# Patient Record
Sex: MALE | Employment: UNEMPLOYED | URBAN - METROPOLITAN AREA
[De-identification: names, ages, dates, MRNs, and addresses within clinical notes are randomized per-mention and may not be internally consistent; named-entity substitution may affect disease eponyms.]

---

## 2023-07-24 ENCOUNTER — OFFICE VISIT (OUTPATIENT)
Dept: URGENT CARE | Facility: CLINIC | Age: 33
End: 2023-07-24
Payer: COMMERCIAL

## 2023-07-24 VITALS
RESPIRATION RATE: 16 BRPM | SYSTOLIC BLOOD PRESSURE: 114 MMHG | TEMPERATURE: 99 F | WEIGHT: 128 LBS | OXYGEN SATURATION: 99 % | BODY MASS INDEX: 17.92 KG/M2 | DIASTOLIC BLOOD PRESSURE: 75 MMHG | HEART RATE: 58 BPM | HEIGHT: 71 IN

## 2023-07-24 DIAGNOSIS — M25.521 RIGHT ELBOW PAIN: Primary | ICD-10-CM

## 2023-07-24 DIAGNOSIS — M25.532 LEFT WRIST PAIN: ICD-10-CM

## 2023-07-24 DIAGNOSIS — T14.90XA INJURY: ICD-10-CM

## 2023-07-24 PROCEDURE — 73080 XR ELBOW COMPLETE 3 VIEW RIGHT: ICD-10-PCS | Mod: RT,S$GLB,, | Performed by: RADIOLOGY

## 2023-07-24 PROCEDURE — 73080 X-RAY EXAM OF ELBOW: CPT | Mod: RT,S$GLB,, | Performed by: RADIOLOGY

## 2023-07-24 PROCEDURE — 99204 OFFICE O/P NEW MOD 45 MIN: CPT | Mod: 25,S$GLB,, | Performed by: NURSE PRACTITIONER

## 2023-07-24 PROCEDURE — 99204 PR OFFICE/OUTPT VISIT, NEW, LEVL IV, 45-59 MIN: ICD-10-PCS | Mod: 25,S$GLB,, | Performed by: NURSE PRACTITIONER

## 2023-07-24 PROCEDURE — 73110 X-RAY EXAM OF WRIST: CPT | Mod: LT,S$GLB,, | Performed by: RADIOLOGY

## 2023-07-24 PROCEDURE — 96372 PR INJECTION,THERAP/PROPH/DIAG2ST, IM OR SUBCUT: ICD-10-PCS | Mod: S$GLB,,, | Performed by: NURSE PRACTITIONER

## 2023-07-24 PROCEDURE — 96372 THER/PROPH/DIAG INJ SC/IM: CPT | Mod: S$GLB,,, | Performed by: NURSE PRACTITIONER

## 2023-07-24 PROCEDURE — 73110 XR WRIST COMPLETE 3 VIEWS LEFT: ICD-10-PCS | Mod: LT,S$GLB,, | Performed by: RADIOLOGY

## 2023-07-24 RX ORDER — IBUPROFEN 800 MG/1
800 TABLET ORAL 3 TIMES DAILY
Qty: 21 TABLET | Refills: 0 | Status: SHIPPED | OUTPATIENT
Start: 2023-07-24 | End: 2023-07-31

## 2023-07-24 RX ORDER — KETOROLAC TROMETHAMINE 30 MG/ML
30 INJECTION, SOLUTION INTRAMUSCULAR; INTRAVENOUS
Status: COMPLETED | OUTPATIENT
Start: 2023-07-24 | End: 2023-07-24

## 2023-07-24 RX ORDER — EMTRICITABINE AND TENOFOVIR DISOPROXIL FUMARATE 200; 300 MG/1; MG/1
1 TABLET, FILM COATED ORAL
COMMUNITY
Start: 2023-06-09 | End: 2023-11-19

## 2023-07-24 RX ADMIN — KETOROLAC TROMETHAMINE 30 MG: 30 INJECTION, SOLUTION INTRAMUSCULAR; INTRAVENOUS at 01:07

## 2023-07-24 NOTE — PATIENT INSTRUCTIONS
- You must understand that you have received an Urgent Care treatment only and that you may be released before all of your medical problems are known or treated.   - You, the patient, will arrange for follow up care as instructed.   - If your condition worsens or fails to improve we recommend that you receive another evaluation at the ER immediately or contact your PCP to discuss your concerns.   - You can call [phone] or [phone] to help schedule an appointment with the appropriate provider.    If the prescribed ibuprofen does not fully relief your pain, you may also take Tylenol 500-1000 mg 3 times per day. Max 4000 mg from all sources per day.   Rest as much as possible  Alternate heat and ice. Apply heat for 20-30 minutes, perform gentle range of motion exercises, and then apply ice for 15 minutes. Do this 3-4 times per day.    If you received an injection of toradol in the clinic today, DO NOT take any anti-inflammatory medications today. These include: Advil/Motrin (ibuprofen), Aleve (naproxen), Mobic (meloxicam).
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
## 2023-07-25 ENCOUNTER — TELEPHONE (OUTPATIENT)
Dept: ORTHOPEDICS | Facility: CLINIC | Age: 33
End: 2023-07-25
Payer: COMMERCIAL

## 2023-07-25 NOTE — TELEPHONE ENCOUNTER
Spoke with pt.  Scheduled him to see Sal Sprague NP on Thursday at 9 am   Pt verbalized understanding.
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
## 2023-07-27 ENCOUNTER — OFFICE VISIT (OUTPATIENT)
Dept: ORTHOPEDICS | Facility: CLINIC | Age: 33
End: 2023-07-27
Payer: COMMERCIAL

## 2023-07-27 ENCOUNTER — HOSPITAL ENCOUNTER (OUTPATIENT)
Dept: RADIOLOGY | Facility: HOSPITAL | Age: 33
Discharge: HOME | End: 2023-07-27
Attending: NURSE PRACTITIONER
Payer: COMMERCIAL

## 2023-07-27 VITALS — BODY MASS INDEX: 17.93 KG/M2 | HEIGHT: 71 IN | WEIGHT: 128.06 LBS

## 2023-07-27 DIAGNOSIS — R52 PAIN: ICD-10-CM

## 2023-07-27 DIAGNOSIS — R52 PAIN: Primary | ICD-10-CM

## 2023-07-27 DIAGNOSIS — S52.124A CLOSED NONDISPLACED FRACTURE OF HEAD OF RIGHT RADIUS, INITIAL ENCOUNTER: Primary | ICD-10-CM

## 2023-07-27 DIAGNOSIS — S69.92XA INJURY OF LEFT WRIST, INITIAL ENCOUNTER: ICD-10-CM

## 2023-07-27 PROCEDURE — 73080 XR ELBOW COMPLETE 3 VIEW RIGHT: ICD-10-PCS | Mod: 26,RT,, | Performed by: RADIOLOGY

## 2023-07-27 PROCEDURE — 73110 XR WRIST COMPLETE 3 VIEWS LEFT: ICD-10-PCS | Mod: 26,LT,, | Performed by: RADIOLOGY

## 2023-07-27 PROCEDURE — 99204 OFFICE O/P NEW MOD 45 MIN: CPT | Mod: S$GLB,,, | Performed by: NURSE PRACTITIONER

## 2023-07-27 PROCEDURE — 73110 X-RAY EXAM OF WRIST: CPT | Mod: 26,LT,, | Performed by: RADIOLOGY

## 2023-07-27 PROCEDURE — 73080 X-RAY EXAM OF ELBOW: CPT | Mod: TC,RT

## 2023-07-27 PROCEDURE — 73080 X-RAY EXAM OF ELBOW: CPT | Mod: 26,RT,, | Performed by: RADIOLOGY

## 2023-07-27 PROCEDURE — 99204 PR OFFICE/OUTPT VISIT, NEW, LEVL IV, 45-59 MIN: ICD-10-PCS | Mod: S$GLB,,, | Performed by: NURSE PRACTITIONER

## 2023-07-27 PROCEDURE — 73110 X-RAY EXAM OF WRIST: CPT | Mod: TC,LT

## 2023-07-27 PROCEDURE — 99999 PR PBB SHADOW E&M-EST. PATIENT-LVL III: CPT | Mod: PBBFAC,,, | Performed by: NURSE PRACTITIONER

## 2023-07-27 PROCEDURE — 99999 PR PBB SHADOW E&M-EST. PATIENT-LVL III: ICD-10-PCS | Mod: PBBFAC,,, | Performed by: NURSE PRACTITIONER

## 2023-07-27 RX ORDER — TRAMADOL HYDROCHLORIDE 50 MG/1
50 TABLET ORAL EVERY 8 HOURS PRN
Qty: 30 TABLET | Refills: 0 | Status: SHIPPED | OUTPATIENT
Start: 2023-07-27 | End: 2023-08-06

## 2023-07-27 RX ORDER — GABAPENTIN 100 MG/1
100 CAPSULE ORAL NIGHTLY
Qty: 30 CAPSULE | Refills: 0 | Status: SHIPPED | OUTPATIENT
Start: 2023-07-27 | End: 2023-08-21 | Stop reason: SDUPTHER
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
## 2023-08-17 ENCOUNTER — HOSPITAL ENCOUNTER (OUTPATIENT)
Dept: RADIOLOGY | Facility: HOSPITAL | Age: 33
Discharge: HOME | End: 2023-08-17
Attending: NURSE PRACTITIONER
Payer: COMMERCIAL

## 2023-08-17 ENCOUNTER — OFFICE VISIT (OUTPATIENT)
Dept: ORTHOPEDICS | Facility: CLINIC | Age: 33
End: 2023-08-17
Payer: COMMERCIAL

## 2023-08-17 VITALS — WEIGHT: 128.06 LBS | BODY MASS INDEX: 17.93 KG/M2 | HEIGHT: 71 IN

## 2023-08-17 DIAGNOSIS — R52 PAIN: ICD-10-CM

## 2023-08-17 DIAGNOSIS — R52 PAIN: Primary | ICD-10-CM

## 2023-08-17 DIAGNOSIS — S69.92XD INJURY OF LEFT WRIST, SUBSEQUENT ENCOUNTER: ICD-10-CM

## 2023-08-17 DIAGNOSIS — S52.124D CLOSED NONDISPLACED FRACTURE OF HEAD OF RIGHT RADIUS WITH ROUTINE HEALING, SUBSEQUENT ENCOUNTER: Primary | ICD-10-CM

## 2023-08-17 PROCEDURE — 73080 X-RAY EXAM OF ELBOW: CPT | Mod: TC,RT

## 2023-08-17 PROCEDURE — 73110 X-RAY EXAM OF WRIST: CPT | Mod: TC,LT

## 2023-08-17 PROCEDURE — 99213 OFFICE O/P EST LOW 20 MIN: CPT | Mod: S$GLB,,, | Performed by: NURSE PRACTITIONER

## 2023-08-17 PROCEDURE — 73110 X-RAY EXAM OF WRIST: CPT | Mod: 26,LT,, | Performed by: RADIOLOGY

## 2023-08-17 PROCEDURE — 73080 XR ELBOW COMPLETE 3 VIEW RIGHT: ICD-10-PCS | Mod: 26,RT,, | Performed by: RADIOLOGY

## 2023-08-17 PROCEDURE — 99999 PR PBB SHADOW E&M-EST. PATIENT-LVL III: CPT | Mod: PBBFAC,,, | Performed by: NURSE PRACTITIONER

## 2023-08-17 PROCEDURE — 73110 XR WRIST COMPLETE 3 VIEWS LEFT: ICD-10-PCS | Mod: 26,LT,, | Performed by: RADIOLOGY

## 2023-08-17 PROCEDURE — 73080 X-RAY EXAM OF ELBOW: CPT | Mod: 26,RT,, | Performed by: RADIOLOGY

## 2023-08-17 PROCEDURE — 99213 PR OFFICE/OUTPT VISIT, EST, LEVL III, 20-29 MIN: ICD-10-PCS | Mod: S$GLB,,, | Performed by: NURSE PRACTITIONER

## 2023-08-17 PROCEDURE — 99999 PR PBB SHADOW E&M-EST. PATIENT-LVL III: ICD-10-PCS | Mod: PBBFAC,,, | Performed by: NURSE PRACTITIONER
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
## 2023-08-20 ENCOUNTER — OFFICE VISIT (OUTPATIENT)
Dept: URGENT CARE | Facility: CLINIC | Age: 33
End: 2023-08-20
Payer: COMMERCIAL

## 2023-08-20 ENCOUNTER — ON-DEMAND VIRTUAL (OUTPATIENT)
Dept: URGENT CARE | Facility: CLINIC | Age: 33
End: 2023-08-20
Payer: COMMERCIAL

## 2023-08-20 VITALS
WEIGHT: 128 LBS | BODY MASS INDEX: 17.92 KG/M2 | TEMPERATURE: 98 F | OXYGEN SATURATION: 96 % | SYSTOLIC BLOOD PRESSURE: 104 MMHG | HEIGHT: 71 IN | HEART RATE: 85 BPM | RESPIRATION RATE: 18 BRPM | DIASTOLIC BLOOD PRESSURE: 71 MMHG

## 2023-08-20 DIAGNOSIS — N50.89 TESTICULAR SWELLING, RIGHT: Primary | ICD-10-CM

## 2023-08-20 DIAGNOSIS — N50.82 SCROTUM PAIN: Primary | ICD-10-CM

## 2023-08-20 PROCEDURE — 99212 OFFICE O/P EST SF 10 MIN: CPT | Mod: 95,,,

## 2023-08-20 PROCEDURE — 99212 PR OFFICE/OUTPT VISIT, EST, LEVL II, 10-19 MIN: ICD-10-PCS | Mod: S$GLB,,, | Performed by: NURSE PRACTITIONER

## 2023-08-20 PROCEDURE — 99212 PR OFFICE/OUTPT VISIT, EST, LEVL II, 10-19 MIN: ICD-10-PCS | Mod: 95,,,

## 2023-08-20 PROCEDURE — 99212 OFFICE O/P EST SF 10 MIN: CPT | Mod: S$GLB,,, | Performed by: NURSE PRACTITIONER

## 2023-08-20 NOTE — PROGRESS NOTES
Subjective:      Patient ID: Genaro Chandler is a 33 y.o. male.    Vitals:  vitals were not taken for this visit.     Chief Complaint: Abscess      Visit Type: TELE AUDIOVISUAL    Present with the patient at the time of consultation: TELEMED PRESENT WITH PATIENT: None    History reviewed. No pertinent past medical history.  Past Surgical History:   Procedure Laterality Date    TONSILLECTOMY AND ADENOIDECTOMY Bilateral      Review of patient's allergies indicates:  No Known Allergies  Current Outpatient Medications on File Prior to Visit   Medication Sig Dispense Refill    emtricitabine-tenofovir 200-300 mg (TRUVADA) 200-300 mg Tab Take 1 tablet by mouth.      gabapentin (NEURONTIN) 100 MG capsule Take 1 capsule (100 mg total) by mouth every evening. 30 capsule 0     No current facility-administered medications on file prior to visit.     Family History   Problem Relation Age of Onset    No Known Problems Mother     No Known Problems Father     No Known Problems Sister            Ohs Peq Odvv Intake    8/20/2023  9:02 AM CDT - Filed by Patient   Describe your reason for todays visit Possible hernia   What is your current physical [address] in the event of a medical emergency[address]   Are you able to take your vital signs? No   Please attach any relevant images or files          Patient states that he has an abscess noted to the inner right groin area with testicular swelling. Patient states that he is also having pain in this area. Patient states that if he is laying down it is not painful but when he stands up or does anything it is very painful. Patient states that he does not have any discoloration noted to his testicles at this time but is having pain.     Abscess      Constitution: Negative.   HENT: Negative.     Neck: neck negative. Positive for painful lymph nodes.   Cardiovascular: Negative.    Eyes: Negative.    Respiratory: Negative.     Gastrointestinal: Negative.    Endocrine:  negative.   Genitourinary:  Positive for scrotal swelling and testicular pain.   Musculoskeletal: Negative.    Skin: Negative.    Allergic/Immunologic: Negative.    Neurological: Negative.    Hematologic/Lymphatic: Positive for swollen lymph nodes.   Psychiatric/Behavioral: Negative.          Objective:   The physical exam was conducted virtually.  Physical Exam   Constitutional: He is oriented to person, place, and time. He is cooperative. awake  HENT:   Head: Normocephalic and atraumatic.   Eyes: Conjunctivae are normal. Pupils are equal, round, and reactive to light. Extraocular movement intact   Pulmonary/Chest: Effort normal.   Abdominal: Normal appearance.   Musculoskeletal: Normal range of motion.         General: Normal range of motion.   Neurological: no focal deficit. He is alert, oriented to person, place, and time and at baseline.   Skin: Skin is warm.   Psychiatric: His behavior is normal. Mood, judgment and thought content normal.       Assessment:     1. Testicular swelling, right        Plan:       Testicular swelling, right

## 2023-08-20 NOTE — PATIENT INSTRUCTIONS
May take Motrin 400mg prn pain  May use cool compress to area  Instruct on U/S and f/u with urology

## 2023-08-20 NOTE — PROGRESS NOTES
"Subjective:      Patient ID: Genaro Chandler is a 33 y.o. male.    Vitals:  height is 5' 11" (1.803 m) and weight is 58.1 kg (128 lb). His oral temperature is 98.3 °F (36.8 °C). His blood pressure is 104/71 and his pulse is 85. His respiration is 18 and oxygen saturation is 96%.     Chief Complaint: Mass    Pt presents with possible hernia to right testicle and lump right above it. Pt states pain when sitting is 4.     Mass  This is a new problem. The current episode started yesterday. The problem occurs intermittently. The problem has been unchanged. Associated symptoms include abdominal pain. The symptoms are aggravated by bending, coughing, exertion, walking and twisting. He has tried ice for the symptoms. The treatment provided no relief.       Constitution: Negative.   Respiratory: Negative.  Negative for shortness of breath.    Gastrointestinal:  Positive for abdominal pain.   Genitourinary:  Positive for testicular pain. Negative for dysuria, genital trauma, penile discharge and penile pain.      Objective:     Physical Exam   Pulmonary/Chest: Effort normal.   Abdominal: Normal appearance.   Genitourinary: no penile rash. Right testis shows swelling and tenderness. Right testis shows no mass.   Lymphadenopathy: Inguinal adenopathy noted on the right side.   Neurological: He is alert.       Assessment:     1. Scrotum pain        Plan:    Mr. Dawson presents for R groin and scrotum pain. States upon awaking he had pain with mild swelling to his R groin area. Also noted his R scrotum in tender with mild swelling. Denies any trauma or heavy lifting. Denies any fever, chill, abd pain, flank pain, penile discharge or dysuria.     Scrotum pain  -     US Scrotum And Testicles; Future; Expected date: 08/20/2023  -     Ambulatory referral/consult to Urology                    "

## 2023-08-21 ENCOUNTER — PATIENT MESSAGE (OUTPATIENT)
Dept: ORTHOPEDICS | Facility: CLINIC | Age: 33
End: 2023-08-21
Payer: COMMERCIAL

## 2023-08-21 DIAGNOSIS — S69.92XA INJURY OF LEFT WRIST, INITIAL ENCOUNTER: ICD-10-CM

## 2023-08-21 DIAGNOSIS — S52.124A CLOSED NONDISPLACED FRACTURE OF HEAD OF RIGHT RADIUS, INITIAL ENCOUNTER: ICD-10-CM

## 2023-08-21 RX ORDER — GABAPENTIN 100 MG/1
100 CAPSULE ORAL NIGHTLY
Qty: 30 CAPSULE | Refills: 0 | Status: ON HOLD | OUTPATIENT
Start: 2023-08-21 | End: 2023-09-25 | Stop reason: HOSPADM

## 2023-08-22 ENCOUNTER — HOSPITAL ENCOUNTER (OUTPATIENT)
Dept: RADIOLOGY | Facility: OTHER | Age: 33
Discharge: HOME | End: 2023-08-22
Attending: NURSE PRACTITIONER
Payer: COMMERCIAL

## 2023-08-22 ENCOUNTER — TELEPHONE (OUTPATIENT)
Dept: URGENT CARE | Facility: CLINIC | Age: 33
End: 2023-08-22
Payer: COMMERCIAL

## 2023-08-22 DIAGNOSIS — N50.82 SCROTUM PAIN: ICD-10-CM

## 2023-08-22 PROCEDURE — 76870 US EXAM SCROTUM: CPT | Mod: TC

## 2023-08-22 PROCEDURE — 76870 US SCROTUM AND TESTICLES: ICD-10-PCS | Mod: 26,,, | Performed by: RADIOLOGY

## 2023-08-22 PROCEDURE — 76870 US EXAM SCROTUM: CPT | Mod: 26,,, | Performed by: RADIOLOGY

## 2023-08-22 NOTE — TELEPHONE ENCOUNTER
US Scrotum And Testicles    Result Date: 8/22/2023  EXAMINATION: US SCROTUM AND TESTICLES CLINICAL HISTORY: Scrotal pain TECHNIQUE: Sonography of the scrotum and testes. COMPARISON: None. FINDINGS: Right Testicle: *Size: 5 x 2.6 x 3.1 cm *Appearance: Normal. *Flow: Normal arterial and venous flow *Epididymis: Normal. *Hydrocele: None. *Varicocele: None. . Left Testicle: *Size: 4.7 x 2.8 x 2.9 cm *Appearance: Normal. *Flow: Normal arterial and venous flow *Epididymis: Normal. *Hydrocele: None. *Varicocele: None. . Other findings: None.     No significant abnormality. Electronically signed by: Johanna Nava Date:    08/22/2023 Time:    16:21    No voicemail set up.

## 2023-08-23 ENCOUNTER — TELEPHONE (OUTPATIENT)
Dept: URGENT CARE | Facility: CLINIC | Age: 33
End: 2023-08-23
Payer: COMMERCIAL

## 2023-08-23 NOTE — TELEPHONE ENCOUNTER
Unable to leave a message due to phone/answering machine problem. No voice mail set up. Pt seen for scrotal pain and scrotal US ordered. The results were normal. Second attempt

## 2023-08-29 ENCOUNTER — OFFICE VISIT (OUTPATIENT)
Dept: ORTHOPEDICS | Facility: CLINIC | Age: 33
End: 2023-08-29
Payer: COMMERCIAL

## 2023-08-29 ENCOUNTER — HOSPITAL ENCOUNTER (OUTPATIENT)
Dept: RADIOLOGY | Facility: OTHER | Age: 33
Discharge: HOME | End: 2023-08-29
Attending: PHYSICIAN ASSISTANT
Payer: COMMERCIAL

## 2023-08-29 ENCOUNTER — TELEPHONE (OUTPATIENT)
Dept: ORTHOPEDICS | Facility: CLINIC | Age: 33
End: 2023-08-29
Payer: COMMERCIAL

## 2023-08-29 ENCOUNTER — TELEPHONE (OUTPATIENT)
Dept: NEUROLOGY | Facility: CLINIC | Age: 33
End: 2023-08-29
Payer: COMMERCIAL

## 2023-08-29 VITALS — BODY MASS INDEX: 17.93 KG/M2 | HEIGHT: 71 IN | WEIGHT: 128.06 LBS

## 2023-08-29 DIAGNOSIS — S62.015A CLOSED NONDISPLACED FRACTURE OF DISTAL POLE OF SCAPHOID BONE OF LEFT WRIST, INITIAL ENCOUNTER: Primary | ICD-10-CM

## 2023-08-29 DIAGNOSIS — R52 PAIN: Primary | ICD-10-CM

## 2023-08-29 DIAGNOSIS — S62.015A CLOSED NONDISPLACED FRACTURE OF DISTAL POLE OF SCAPHOID BONE OF LEFT WRIST, INITIAL ENCOUNTER: ICD-10-CM

## 2023-08-29 DIAGNOSIS — R52 PAIN: ICD-10-CM

## 2023-08-29 PROCEDURE — 73110 X-RAY EXAM OF WRIST: CPT | Mod: 26,LT,, | Performed by: RADIOLOGY

## 2023-08-29 PROCEDURE — 99204 OFFICE O/P NEW MOD 45 MIN: CPT | Mod: S$GLB,,, | Performed by: ORTHOPAEDIC SURGERY

## 2023-08-29 PROCEDURE — 73080 XR ELBOW COMPLETE 3 VIEW LEFT: ICD-10-PCS | Mod: 26,LT,, | Performed by: RADIOLOGY

## 2023-08-29 PROCEDURE — 73080 X-RAY EXAM OF ELBOW: CPT | Mod: TC,FY,LT

## 2023-08-29 PROCEDURE — 99999 PR PBB SHADOW E&M-EST. PATIENT-LVL III: CPT | Mod: PBBFAC,,, | Performed by: ORTHOPAEDIC SURGERY

## 2023-08-29 PROCEDURE — 73110 X-RAY EXAM OF WRIST: CPT | Mod: TC,FY,LT

## 2023-08-29 PROCEDURE — 73080 X-RAY EXAM OF ELBOW: CPT | Mod: 26,LT,, | Performed by: RADIOLOGY

## 2023-08-29 PROCEDURE — 73110 XR WRIST COMPLETE 3 VIEWS LEFT: ICD-10-PCS | Mod: 26,LT,, | Performed by: RADIOLOGY

## 2023-08-29 PROCEDURE — 99204 PR OFFICE/OUTPT VISIT, NEW, LEVL IV, 45-59 MIN: ICD-10-PCS | Mod: S$GLB,,, | Performed by: ORTHOPAEDIC SURGERY

## 2023-08-29 PROCEDURE — 99999 PR PBB SHADOW E&M-EST. PATIENT-LVL III: ICD-10-PCS | Mod: PBBFAC,,, | Performed by: ORTHOPAEDIC SURGERY

## 2023-08-29 NOTE — TELEPHONE ENCOUNTER
Called pt informed him that the provider reviewed his images and wants pt to see Dr. Thao today got pt dez for 1pm informed him to arrive for x-ray at 12pm  Pt voiced understanding and call ended.
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
## 2023-08-29 NOTE — PROGRESS NOTES
Pt has a left scaphoid fracture he has been in a brace for 6 weeks he also has ulnar nerve symptoms more pronounced over Guyon's but also over his elbow plan we would like to get an EMG nerve conduction study as well as a CT for the scaphoid will go ahead and order a bone stimulator since it has been 6 weeks since still having healing difficulty he does vape and smokes marijuana did discuss about the vaping no nicotine for the scaphoid healing

## 2023-09-05 ENCOUNTER — HOSPITAL ENCOUNTER (OUTPATIENT)
Dept: RADIOLOGY | Facility: HOSPITAL | Age: 33
Discharge: HOME | End: 2023-09-05
Payer: COMMERCIAL

## 2023-09-05 ENCOUNTER — PROCEDURE VISIT (OUTPATIENT)
Dept: NEUROLOGY | Facility: CLINIC | Age: 33
End: 2023-09-05
Payer: COMMERCIAL

## 2023-09-05 DIAGNOSIS — S62.015A CLOSED NONDISPLACED FRACTURE OF DISTAL POLE OF SCAPHOID BONE OF LEFT WRIST, INITIAL ENCOUNTER: ICD-10-CM

## 2023-09-05 PROCEDURE — 95911 NRV CNDJ TEST 9-10 STUDIES: CPT | Mod: S$GLB,,, | Performed by: PHYSICAL MEDICINE & REHABILITATION

## 2023-09-05 PROCEDURE — 73200 CT UPPER EXTREMITY W/O DYE: CPT | Mod: 26,LT,, | Performed by: RADIOLOGY

## 2023-09-05 PROCEDURE — 95886 MUSC TEST DONE W/N TEST COMP: CPT | Mod: S$GLB,,, | Performed by: PHYSICAL MEDICINE & REHABILITATION

## 2023-09-05 PROCEDURE — 73200 CT WRIST WITHOUT CONTRAST LEFT: ICD-10-PCS | Mod: 26,LT,, | Performed by: RADIOLOGY

## 2023-09-05 PROCEDURE — 95911 PR NERVE CONDUCTION STUDY; 9-10 STUDIES: ICD-10-PCS | Mod: S$GLB,,, | Performed by: PHYSICAL MEDICINE & REHABILITATION

## 2023-09-05 PROCEDURE — 73200 CT UPPER EXTREMITY W/O DYE: CPT | Mod: TC,LT

## 2023-09-05 PROCEDURE — 95886 PR EMG COMPLETE, W/ NERVE CONDUCTION STUDIES, 5+ MUSCLES: ICD-10-PCS | Mod: S$GLB,,, | Performed by: PHYSICAL MEDICINE & REHABILITATION

## 2023-09-05 NOTE — PROCEDURES
Test Date:  2023    Patient: Genaro Linn : 1990 Physician: Perry Lenz D.O.   ID#:  SEX: Male Ref. Phys: Pardeep Zuniga MD     HPI: Genaro Linn is a 33 y.o.male who presents for NCS/EMG to evaluate for left ulnar neuropathy.      NCV & EMG Findings:  All nerves on NCS were normal  All examined muscles (as indicated in the following table) showed no evidence of electrical instability.    Impression:  This is a normal electrophysiologic study of the left upper extremity.  No electrophysiologic evidence of a left ulnar neuropathy.         ___________________________  Perry Lenz D.O.        NCS+  Motor Nerve Results      Latency Amplitude F-Lat Segment Distance CV Comment   Site (ms) Norm (mV) Norm (ms)  (cm) (m/s) Norm    Left Median (APB)   Wrist 3.0  < 4.6 6.1  > 5.9  Wrist-Palm - - -    Elbow 7.2 - 6.2 -  Elbow-Wrist 25 60  > 49    Right Median (APB)   Wrist 3.0  < 4.6 9.1  > 5.9  Wrist-Palm - - -    Elbow 7.3 - 8.9 -  Elbow-Wrist 26 60  > 49    Left Ulnar (ADM)   Wrist 2.8  < 3.7 10.1  > 3.0         Bel Elbow 6.7 - 9.4 -  Bel Elbow-Wrist 26 67  > 52    Abv Elbow 8.8 - 9.2 -  Abv Elbow-Bel Elbow 11 52  > 43    Left Ulnar (FDI)   Wrist 3.3 - 10.7 -         Bel Elbow 7.4 - 9.2 -  Bel Elbow-Wrist 26 63  > 52    Abv Elbow 9.4 - 8.9 -  Abv Elbow-Bel Elbow 11 55  > 43    Right Ulnar (ADM)   Wrist 2.6  < 3.7 10.2  > 3.0         Bel Elbow 6.6 - 9.9 -  Bel Elbow-Wrist 28 70  > 52    Abv Elbow 8.1 - 9.7 -  Abv Elbow-Bel Elbow 10 67  > 43      Sensory Nerve Results      Latency (Peak) Amplitude (P-P) Segment Distance CV Comment   Site (ms) Norm (µV) Norm  (cm) (m/s) Norm    Left Median   Wrist-Dig II 3.0  < 4.0 59  > 19 Wrist-Dig II 14 47  > 39    Right Median   Wrist-Dig II 3.0  < 4.0 62  > 19 Wrist-Dig II 15 50  > 39    Left Ulnar   Wrist-Dig V 2.8  < 4.0 25  > 13 Wrist-Dig V 14 50  > 38    Right Ulnar   Wrist-Dig V 3.3  < 4.0 19  > 13 Wrist-Dig V 14 42  > 38    Left Radial   Forearm-Wrist  1.80  < 2.8 26  > 11 Forearm-Wrist 10 56 -      EMG+     Side Muscle Nerve Root Ins Act Fibs Psw Amp Dur Poly Recrt Int Pat Comment   Left Deltoid Axillary C5-C6 Nml Nml Nml Nml Nml 0 Nml Nml    Left Biceps Musculocut C5-C6 Nml Nml Nml Nml Nml 0 Nml Nml    Left Triceps Radial C6-C8 Nml Nml Nml Nml Nml 0 Nml Nml    Left FDI Ulnar C8-T1 Nml Nml Nml Nml Nml 0 Nml Nml    Left ADM Ulnar C8-T1 Nml Nml Nml Nml Nml 0 Nml Nml            Waveforms:    Motor              Sensory

## 2023-09-12 ENCOUNTER — OFFICE VISIT (OUTPATIENT)
Dept: ORTHOPEDICS | Facility: CLINIC | Age: 33
End: 2023-09-12
Payer: COMMERCIAL

## 2023-09-12 ENCOUNTER — DOCUMENTATION ONLY (OUTPATIENT)
Dept: ORTHOPEDICS | Facility: CLINIC | Age: 33
End: 2023-09-12
Payer: COMMERCIAL

## 2023-09-12 VITALS
BODY MASS INDEX: 17.92 KG/M2 | SYSTOLIC BLOOD PRESSURE: 110 MMHG | HEIGHT: 71 IN | WEIGHT: 128 LBS | HEART RATE: 76 BPM | DIASTOLIC BLOOD PRESSURE: 73 MMHG

## 2023-09-12 DIAGNOSIS — S69.92XD INJURY OF LEFT WRIST, SUBSEQUENT ENCOUNTER: Primary | ICD-10-CM

## 2023-09-12 PROCEDURE — 99999 PR PBB SHADOW E&M-EST. PATIENT-LVL III: ICD-10-PCS | Mod: PBBFAC,,, | Performed by: ORTHOPAEDIC SURGERY

## 2023-09-12 PROCEDURE — 99999 PR PBB SHADOW E&M-EST. PATIENT-LVL III: CPT | Mod: PBBFAC,,, | Performed by: ORTHOPAEDIC SURGERY

## 2023-09-12 PROCEDURE — 99214 PR OFFICE/OUTPT VISIT, EST, LEVL IV, 30-39 MIN: ICD-10-PCS | Mod: S$GLB,,, | Performed by: ORTHOPAEDIC SURGERY

## 2023-09-12 PROCEDURE — 99214 OFFICE O/P EST MOD 30 MIN: CPT | Mod: S$GLB,,, | Performed by: ORTHOPAEDIC SURGERY
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
## 2023-09-14 DIAGNOSIS — S62.015A CLOSED NONDISPLACED FRACTURE OF DISTAL POLE OF SCAPHOID BONE OF LEFT WRIST, INITIAL ENCOUNTER: Primary | ICD-10-CM

## 2023-09-14 DIAGNOSIS — R52 PAIN: ICD-10-CM

## 2023-09-18 ENCOUNTER — ANESTHESIA EVENT (OUTPATIENT)
Dept: SURGERY | Facility: HOSPITAL | Age: 33
End: 2023-09-18
Payer: COMMERCIAL

## 2023-09-20 DIAGNOSIS — S62.015A CLOSED NONDISPLACED FRACTURE OF DISTAL POLE OF SCAPHOID BONE OF LEFT WRIST, INITIAL ENCOUNTER: Primary | ICD-10-CM

## 2023-09-20 RX ORDER — OXYCODONE AND ACETAMINOPHEN 5; 325 MG/1; MG/1
1 TABLET ORAL
Qty: 10 TABLET | Refills: 0 | Status: SHIPPED | OUTPATIENT
Start: 2023-09-20 | End: 2023-11-19

## 2023-09-20 RX ORDER — ACETAMINOPHEN 500 MG
1000 TABLET ORAL 2 TIMES DAILY PRN
Qty: 50 TABLET | Refills: 0 | Status: SHIPPED | OUTPATIENT
Start: 2023-09-20 | End: 2023-11-19

## 2023-09-20 RX ORDER — IBUPROFEN 600 MG/1
600 TABLET ORAL 3 TIMES DAILY PRN
Qty: 45 TABLET | Refills: 0 | Status: SHIPPED | OUTPATIENT
Start: 2023-09-20 | End: 2023-11-19

## 2023-09-22 ENCOUNTER — TELEPHONE (OUTPATIENT)
Dept: ORTHOPEDICS | Facility: CLINIC | Age: 33
End: 2023-09-22
Payer: COMMERCIAL

## 2023-09-22 NOTE — TELEPHONE ENCOUNTER
Spoke c pt. Informed pt of 10:00 a.m. arrival time for surgery at the [facility]. Reminded pt of NPO status & PO appt. Pt expressed understanding & was thankful.

## 2023-09-25 ENCOUNTER — ANESTHESIA (OUTPATIENT)
Dept: SURGERY | Facility: HOSPITAL | Age: 33
End: 2023-09-25
Payer: COMMERCIAL

## 2023-09-25 ENCOUNTER — HOSPITAL ENCOUNTER (OUTPATIENT)
Facility: HOSPITAL | Age: 33
Discharge: HOME | End: 2023-09-25
Attending: ORTHOPAEDIC SURGERY | Admitting: ORTHOPAEDIC SURGERY
Payer: COMMERCIAL

## 2023-09-25 VITALS
SYSTOLIC BLOOD PRESSURE: 101 MMHG | HEIGHT: 71 IN | WEIGHT: 130 LBS | BODY MASS INDEX: 18.2 KG/M2 | DIASTOLIC BLOOD PRESSURE: 60 MMHG | RESPIRATION RATE: 22 BRPM | HEART RATE: 55 BPM | OXYGEN SATURATION: 100 % | TEMPERATURE: 98 F

## 2023-09-25 DIAGNOSIS — S62.002K: Primary | ICD-10-CM

## 2023-09-25 PROCEDURE — 71000033 HC RECOVERY, INTIAL HOUR: Performed by: ORTHOPAEDIC SURGERY

## 2023-09-25 PROCEDURE — 64415 NJX AA&/STRD BRCH PLXS IMG: CPT | Performed by: STUDENT IN AN ORGANIZED HEALTH CARE EDUCATION/TRAINING PROGRAM

## 2023-09-25 PROCEDURE — D9220A PRA ANESTHESIA: Mod: ANES,,, | Performed by: ANESTHESIOLOGY

## 2023-09-25 PROCEDURE — 36000708 HC OR TIME LEV III 1ST 15 MIN: Performed by: ORTHOPAEDIC SURGERY

## 2023-09-25 PROCEDURE — 25000003 PHARM REV CODE 250: Performed by: ORTHOPAEDIC SURGERY

## 2023-09-25 PROCEDURE — 71000015 HC POSTOP RECOV 1ST HR: Performed by: ORTHOPAEDIC SURGERY

## 2023-09-25 PROCEDURE — 99900035 HC TECH TIME PER 15 MIN (STAT)

## 2023-09-25 PROCEDURE — 63600175 PHARM REV CODE 636 W HCPCS: Performed by: ANESTHESIOLOGY

## 2023-09-25 PROCEDURE — 25000003 PHARM REV CODE 250

## 2023-09-25 PROCEDURE — C1769 GUIDE WIRE: HCPCS | Performed by: ORTHOPAEDIC SURGERY

## 2023-09-25 PROCEDURE — 37000008 HC ANESTHESIA 1ST 15 MINUTES: Performed by: ORTHOPAEDIC SURGERY

## 2023-09-25 PROCEDURE — 94761 N-INVAS EAR/PLS OXIMETRY MLT: CPT

## 2023-09-25 PROCEDURE — 25000003 PHARM REV CODE 250: Performed by: ANESTHESIOLOGY

## 2023-09-25 PROCEDURE — 63600175 PHARM REV CODE 636 W HCPCS

## 2023-09-25 PROCEDURE — 36000709 HC OR TIME LEV III EA ADD 15 MIN: Performed by: ORTHOPAEDIC SURGERY

## 2023-09-25 PROCEDURE — 27201423 OPTIME MED/SURG SUP & DEVICES STERILE SUPPLY: Performed by: ORTHOPAEDIC SURGERY

## 2023-09-25 PROCEDURE — 63600175 PHARM REV CODE 636 W HCPCS: Performed by: NURSE ANESTHETIST, CERTIFIED REGISTERED

## 2023-09-25 PROCEDURE — 25000003 PHARM REV CODE 250: Performed by: NURSE ANESTHETIST, CERTIFIED REGISTERED

## 2023-09-25 PROCEDURE — D9220A PRA ANESTHESIA: ICD-10-PCS | Mod: ANES,,, | Performed by: ANESTHESIOLOGY

## 2023-09-25 PROCEDURE — C1713 ANCHOR/SCREW BN/BN,TIS/BN: HCPCS | Performed by: ORTHOPAEDIC SURGERY

## 2023-09-25 PROCEDURE — D9220A PRA ANESTHESIA: Mod: CRNA,,, | Performed by: NURSE ANESTHETIST, CERTIFIED REGISTERED

## 2023-09-25 PROCEDURE — 37000009 HC ANESTHESIA EA ADD 15 MINS: Performed by: ORTHOPAEDIC SURGERY

## 2023-09-25 PROCEDURE — 64415 L SUPRACLAVICULAR SINGLE SHOT: ICD-10-PCS | Mod: 59,LT,, | Performed by: ANESTHESIOLOGY

## 2023-09-25 PROCEDURE — D9220A PRA ANESTHESIA: ICD-10-PCS | Mod: CRNA,,, | Performed by: NURSE ANESTHETIST, CERTIFIED REGISTERED

## 2023-09-25 PROCEDURE — 25628 OPTX CARPL SCPHD FX INT FIXJ: CPT | Mod: LT,,, | Performed by: ORTHOPAEDIC SURGERY

## 2023-09-25 PROCEDURE — 64415 NJX AA&/STRD BRCH PLXS IMG: CPT | Mod: 59,LT,, | Performed by: ANESTHESIOLOGY

## 2023-09-25 PROCEDURE — 25628 PR OPEN TX CARPAL SCAPHOID NAVICULAR FRACTURE: ICD-10-PCS | Mod: LT,,, | Performed by: ORTHOPAEDIC SURGERY

## 2023-09-25 RX ORDER — MIDAZOLAM HYDROCHLORIDE 1 MG/ML
.5-4 INJECTION INTRAMUSCULAR; INTRAVENOUS
Status: DISPENSED | OUTPATIENT
Start: 2023-09-25

## 2023-09-25 RX ORDER — ROPIVACAINE HYDROCHLORIDE 5 MG/ML
INJECTION, SOLUTION EPIDURAL; INFILTRATION; PERINEURAL
Status: COMPLETED | OUTPATIENT
Start: 2023-09-25 | End: 2023-09-25

## 2023-09-25 RX ORDER — LIDOCAINE HYDROCHLORIDE 20 MG/ML
INJECTION INTRAVENOUS
Status: DISCONTINUED | OUTPATIENT
Start: 2023-09-25 | End: 2023-09-25

## 2023-09-25 RX ORDER — MIDAZOLAM HYDROCHLORIDE 1 MG/ML
INJECTION, SOLUTION INTRAMUSCULAR; INTRAVENOUS
Status: DISCONTINUED | OUTPATIENT
Start: 2023-09-25 | End: 2023-09-25

## 2023-09-25 RX ORDER — FENTANYL CITRATE 50 UG/ML
25-200 INJECTION, SOLUTION INTRAMUSCULAR; INTRAVENOUS
Status: DISPENSED | OUTPATIENT
Start: 2023-09-25

## 2023-09-25 RX ORDER — ONDANSETRON 2 MG/ML
4 INJECTION INTRAMUSCULAR; INTRAVENOUS EVERY 12 HOURS PRN
Status: DISCONTINUED | OUTPATIENT
Start: 2023-09-25 | End: 2023-09-25 | Stop reason: HOSPADM

## 2023-09-25 RX ORDER — PROPOFOL 10 MG/ML
VIAL (ML) INTRAVENOUS
Status: DISCONTINUED | OUTPATIENT
Start: 2023-09-25 | End: 2023-09-25

## 2023-09-25 RX ORDER — HYDROCODONE BITARTRATE AND ACETAMINOPHEN 5; 325 MG/1; MG/1
1 TABLET ORAL EVERY 4 HOURS PRN
Status: DISCONTINUED | OUTPATIENT
Start: 2023-09-25 | End: 2023-09-25 | Stop reason: HOSPADM

## 2023-09-25 RX ORDER — DEXAMETHASONE SODIUM PHOSPHATE 4 MG/ML
INJECTION, SOLUTION INTRA-ARTICULAR; INTRALESIONAL; INTRAMUSCULAR; INTRAVENOUS; SOFT TISSUE
Status: DISCONTINUED | OUTPATIENT
Start: 2023-09-25 | End: 2023-09-25

## 2023-09-25 RX ORDER — MUPIROCIN 20 MG/G
OINTMENT TOPICAL
Status: DISPENSED | OUTPATIENT
Start: 2023-09-25

## 2023-09-25 RX ORDER — OXYCODONE HYDROCHLORIDE 5 MG/1
5 TABLET ORAL
Status: DISCONTINUED | OUTPATIENT
Start: 2023-09-25 | End: 2023-09-25 | Stop reason: HOSPADM

## 2023-09-25 RX ORDER — ACETAMINOPHEN 500 MG
1000 TABLET ORAL ONCE
Status: COMPLETED | OUTPATIENT
Start: 2023-09-25 | End: 2023-09-25

## 2023-09-25 RX ORDER — SODIUM CHLORIDE 0.9 % (FLUSH) 0.9 %
3 SYRINGE (ML) INJECTION
Status: DISCONTINUED | OUTPATIENT
Start: 2023-09-25 | End: 2023-09-25 | Stop reason: HOSPADM

## 2023-09-25 RX ORDER — DEXMEDETOMIDINE HYDROCHLORIDE 100 UG/ML
INJECTION, SOLUTION INTRAVENOUS
Status: DISCONTINUED | OUTPATIENT
Start: 2023-09-25 | End: 2023-09-25

## 2023-09-25 RX ORDER — PROPOFOL 10 MG/ML
VIAL (ML) INTRAVENOUS CONTINUOUS PRN
Status: DISCONTINUED | OUTPATIENT
Start: 2023-09-25 | End: 2023-09-25

## 2023-09-25 RX ORDER — SODIUM CHLORIDE 0.9 % (FLUSH) 0.9 %
5 SYRINGE (ML) INJECTION
Status: DISCONTINUED | OUTPATIENT
Start: 2023-09-25 | End: 2023-09-25 | Stop reason: HOSPADM

## 2023-09-25 RX ORDER — ONDANSETRON 2 MG/ML
INJECTION INTRAMUSCULAR; INTRAVENOUS
Status: DISCONTINUED | OUTPATIENT
Start: 2023-09-25 | End: 2023-09-25

## 2023-09-25 RX ORDER — HALOPERIDOL 5 MG/ML
0.5 INJECTION INTRAMUSCULAR EVERY 10 MIN PRN
Status: DISCONTINUED | OUTPATIENT
Start: 2023-09-25 | End: 2023-09-25 | Stop reason: HOSPADM

## 2023-09-25 RX ORDER — FENTANYL CITRATE 50 UG/ML
25 INJECTION, SOLUTION INTRAMUSCULAR; INTRAVENOUS EVERY 5 MIN PRN
Status: DISCONTINUED | OUTPATIENT
Start: 2023-09-25 | End: 2023-09-25 | Stop reason: HOSPADM

## 2023-09-25 RX ORDER — BACITRACIN ZINC 500 UNIT/G
OINTMENT (GRAM) TOPICAL
Status: DISCONTINUED | OUTPATIENT
Start: 2023-09-25 | End: 2023-09-25 | Stop reason: HOSPADM

## 2023-09-25 RX ORDER — HYDROCODONE BITARTRATE AND ACETAMINOPHEN 10; 325 MG/1; MG/1
1 TABLET ORAL EVERY 4 HOURS PRN
Status: DISCONTINUED | OUTPATIENT
Start: 2023-09-25 | End: 2023-09-25 | Stop reason: HOSPADM

## 2023-09-25 RX ORDER — FENTANYL CITRATE 50 UG/ML
INJECTION, SOLUTION INTRAMUSCULAR; INTRAVENOUS
Status: DISCONTINUED | OUTPATIENT
Start: 2023-09-25 | End: 2023-09-25

## 2023-09-25 RX ADMIN — PROPOFOL 20 MG: 10 INJECTION, EMULSION INTRAVENOUS at 12:09

## 2023-09-25 RX ADMIN — MIDAZOLAM 2 MG: 1 INJECTION INTRAMUSCULAR; INTRAVENOUS at 10:09

## 2023-09-25 RX ADMIN — PROPOFOL 125 MCG/KG/MIN: 10 INJECTION, EMULSION INTRAVENOUS at 12:09

## 2023-09-25 RX ADMIN — MIDAZOLAM HYDROCHLORIDE 2 MG: 1 INJECTION, SOLUTION INTRAMUSCULAR; INTRAVENOUS at 11:09

## 2023-09-25 RX ADMIN — CEFAZOLIN 2 G: 2 INJECTION, POWDER, FOR SOLUTION INTRAMUSCULAR; INTRAVENOUS at 12:09

## 2023-09-25 RX ADMIN — MUPIROCIN: 20 OINTMENT TOPICAL at 10:09

## 2023-09-25 RX ADMIN — LIDOCAINE HYDROCHLORIDE 60 MG: 20 INJECTION INTRAVENOUS at 12:09

## 2023-09-25 RX ADMIN — ONDANSETRON 4 MG: 2 INJECTION INTRAMUSCULAR; INTRAVENOUS at 12:09

## 2023-09-25 RX ADMIN — PROPOFOL 50 MG: 10 INJECTION, EMULSION INTRAVENOUS at 12:09

## 2023-09-25 RX ADMIN — ACETAMINOPHEN 1000 MG: 500 TABLET ORAL at 10:09

## 2023-09-25 RX ADMIN — ROPIVACAINE HYDROCHLORIDE 30 ML: 5 INJECTION EPIDURAL; INFILTRATION; PERINEURAL at 11:09

## 2023-09-25 RX ADMIN — FENTANYL CITRATE 50 MCG: 50 INJECTION, SOLUTION INTRAMUSCULAR; INTRAVENOUS at 12:09

## 2023-09-25 RX ADMIN — DEXAMETHASONE SODIUM PHOSPHATE 4 MG: 4 INJECTION, SOLUTION INTRAMUSCULAR; INTRAVENOUS at 12:09

## 2023-09-25 RX ADMIN — DEXMEDETOMIDINE 8 MCG: 100 INJECTION, SOLUTION, CONCENTRATE INTRAVENOUS at 12:09

## 2023-09-25 RX ADMIN — FENTANYL CITRATE 50 MCG: 50 INJECTION INTRAMUSCULAR; INTRAVENOUS at 11:09

## 2023-09-25 RX ADMIN — FENTANYL CITRATE 50 MCG: 50 INJECTION INTRAMUSCULAR; INTRAVENOUS at 10:09

## 2023-09-25 RX ADMIN — SODIUM CHLORIDE: 0.9 INJECTION, SOLUTION INTRAVENOUS at 11:09

## 2023-09-25 NOTE — ANESTHESIA PREPROCEDURE EVALUATION
"                                                                                                             09/25/2023  Genaro Linn is a 33 y.o., male.    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     Current Outpatient Medications on File Prior to Encounter   Medication Sig Dispense Refill    emtricitabine-tenofovir 200-300 mg (TRUVADA) 200-300 mg Tab Take 1 tablet by mouth.      gabapentin (NEURONTIN) 100 MG capsule Take 1 capsule (100 mg total) by mouth every evening. 30 capsule 0       Past Surgical History:   Procedure Laterality Date    TONSILLECTOMY AND ADENOIDECTOMY Bilateral          CBC:  No results found for: "WBC", "RBC", "HGB", "HCT", "PLT", "MCV", "MCH", "MCHC"    CMP:   No results found for: "NA", "K", "CL", "CO2", "BUN", "CREATININE", "GLU", "MG", "PHOS", "CALCIUM", "ALBUMIN", "PROT", "ALKPHOS", "ALT", "AST", "BILITOT"    INR:  No results found for: "PT", "INR", "PROTIME", "APTT"          Pre-op Vitals [09/25/23 1024]   BP Pulse Resp Temp SpO2   123/70 67 16 36.5 °C (97.7 °F) 96 %      Height Weight BMI (Calculated)     5' 11" 130 lb 18.1          Pre-op Assessment    I have reviewed the Patient Summary Reports.     I have reviewed the Nursing Notes. I have reviewed the NPO Status.      Review of Systems  Anesthesia Hx:  No problems with previous Anesthesia    Social:  No Alcohol Use, Non-Smoker    Cardiovascular:   Exercise tolerance: good    Hepatic/GI:   Denies GERD.        Physical Exam  General: Well nourished and Cooperative    Airway:  Mallampati: II   Mouth Opening: Normal  TM Distance: Normal      Dental:  Intact    Chest/Lungs:  Normal Respiratory Rate, Clear to " auscultation    Heart:  Rate: Normal  Rhythm: Regular Rhythm        Anesthesia Plan  Type of Anesthesia, risks & benefits discussed:    Anesthesia Type: Gen ETT, Gen Natural Airway, Regional  Intra-op Monitoring Plan: Standard ASA Monitors  Post Op Pain Control Plan: multimodal analgesia and IV/PO Opioids PRN  Induction:  IV  Informed Consent: Informed consent signed with the Patient and all parties understand the risks and agree with anesthesia plan.  All questions answered.   ASA Score: 1    Ready For Surgery From Anesthesia Perspective.     .

## 2023-09-25 NOTE — ANESTHESIA PROCEDURE NOTES
L supraclavicular single shot    Patient location during procedure: pre-op   Block not for primary anesthetic.  Reason for block: at surgeon's request and post-op pain management   Post-op Pain Location: left hand pain   Start time: 9/25/2023 10:50 AM  Timeout: 9/25/2023 10:50 AM   End time: 9/25/2023 11:00 AM    Staffing  Authorizing Provider: Silvia Harden MD  Performing Provider: Angela Jaeger MD    Staffing  Performed by: Angela Jaeger MD  Authorized by: Silvia Harden MD    Preanesthetic Checklist  Completed: patient identified, IV checked, site marked, risks and benefits discussed, surgical consent, monitors and equipment checked, pre-op evaluation and timeout performed  Peripheral Block  Patient position: supine  Prep: ChloraPrep  Patient monitoring: heart rate, cardiac monitor, continuous pulse ox, continuous capnometry and frequent blood pressure checks  Block type: supraclavicular  Laterality: left  Injection technique: single shot  Needle  Needle type: Stimuplex   Needle gauge: 22 G  Needle length: 2 in  Needle localization: anatomical landmarks and ultrasound guidance   -ultrasound image captured on disc.  Assessment  Injection assessment: negative aspiration, negative parasthesia and local visualized surrounding nerve  Paresthesia pain: none  Heart rate change: no  Slow fractionated injection: yes  Pain Tolerance: no complaints and comfortable throughout block  Medications:    Medications: ropivacaine (NAROPIN) injection 0.5% - Perineural   30 mL - 9/25/2023 11:00:00 AM    Additional Notes  VSS.  [facility] RN monitoring vitals throughout procedure.  Patient tolerated procedure well.

## 2023-09-25 NOTE — TRANSFER OF CARE
"Anesthesia Transfer of Care Note    Patient: Genaro Linn    Procedure(s) Performed: Procedure(s) (LRB):  ORIF, HAND,LEFT (Left)    Patient location: PACU    Anesthesia Type: regional    Transport from OR: Transported from OR on room air with adequate spontaneous ventilation    Post pain: adequate analgesia    Post assessment: no apparent anesthetic complications and tolerated procedure well    Post vital signs: stable    Level of consciousness: awake, alert and oriented    Nausea/Vomiting: no nausea/vomiting    Complications: none    Transfer of care protocol was followed      Last vitals:   Visit Vitals  /60 (BP Location: Right arm, Patient Position: Lying)   Pulse 66   Temp 36.5 °C (97.7 °F) (Temporal)   Resp 14   Ht 5' 11" (1.803 m)   Wt 59 kg (130 lb)   SpO2 98%   BMI 18.13 kg/m²     "

## 2023-09-25 NOTE — PLAN OF CARE
Pre op complete. Waiting on anesthesia consent. Pt's friend at bedside; he will hold belongings. Pt resting comfortably with all questions addressed at this time and call light in reach.

## 2023-09-25 NOTE — BRIEF OP NOTE
[facility] - Surgery (Hospital)  Brief Operative Note    Surgery Date: 9/25/2023     Surgeon(s) and Role:     * Crystal Tran MD - Primary    Assisting Surgeon: None    Pre-op Diagnosis:  Closed nondisplaced fracture of distal pole of scaphoid bone of left wrist, initial encounter [S62.015A]  Pain [R52]    Post-op Diagnosis:  Post-Op Diagnosis Codes:     * Closed nondisplaced fracture of distal pole of scaphoid bone of left wrist, initial encounter [S62.015A]     * Pain [R52]    Procedure(s) (LRB):  ORIF, HAND,LEFT (Left)    Anesthesia: Regional    Operative Findings: see op note    Estimated Blood Loss: * No values recorded between 9/25/2023 12:22 PM and 9/25/2023 12:59 PM *         Specimens:   Specimen (24h ago, onward)      None              Discharge Note    OUTCOME: Patient tolerated treatment/procedure well without complication and is now ready for discharge.    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    FOLLOWUP: In clinic    DISCHARGE INSTRUCTIONS:    Discharge Procedure Orders   Diet general     Sponge bath only until clinic visit     Leave dressing on - Keep it clean, dry, and intact until clinic visit     Call MD for:  temperature >100.4     Call MD for:  persistent nausea and vomiting     Call MD for:  severe uncontrolled pain     Call MD for:  difficulty breathing, headache or visual disturbances     Call MD for:  redness, tenderness, or signs of infection (pain, swelling, redness, odor or green/yellow discharge around incision site)     Call MD for:  hives     Call MD for:  persistent dizziness or light-headedness     Call MD for:  extreme fatigue

## 2023-09-25 NOTE — ANESTHESIA POSTPROCEDURE EVALUATION
Anesthesia Post Evaluation    Patient: Genaro Linn    Procedure(s) Performed: Procedure(s) (LRB):  ORIF, HAND,LEFT (Left)    Final Anesthesia Type: general      Patient location during evaluation: PACU  Patient participation: Yes- Able to Participate  Level of consciousness: awake and alert and oriented  Post-procedure vital signs: reviewed and stable  Pain management: adequate  Airway patency: patent    PONV status at discharge: No PONV  Anesthetic complications: no      Cardiovascular status: hemodynamically stable  Respiratory status: nasal cannula  Hydration status: euvolemic  Follow-up not needed.          Vitals Value Taken Time   /60 09/25/23 1332   Temp 36.6 °C (97.8 °F) 09/25/23 1330   Pulse 57 09/25/23 1332   Resp 41 09/25/23 1332   SpO2 98 % 09/25/23 1331   Vitals shown include unvalidated device data.      Event Time   Out of Recovery 13:30:00         Pain/Dylan Score: Pain Rating Prior to Med Admin: 2 (9/25/2023 11:00 AM)  Dylan Score: 10 (9/25/2023  1:20 PM)

## 2023-09-25 NOTE — PLAN OF CARE
VSS. Patient able to tolerate oral liquids. Patient denies c/o pain. Patient received home medication per bedside delivery. Dressing intact. Knee TEDs intact. Patient instructed not to wear GEOVANY hose without wearing closed-back shoes or  socks due to increased risk of falls, verbalized understanding. No distress noted. Patient states he is ready for discharge. Discharge instructions reviewed with patient and friend, verbalized understanding. IV discontinued with catheter tip intact. Staff at bedside to help patient dress.

## 2023-09-25 NOTE — INTERVAL H&P NOTE
The patient has been examined and the H&P has been reviewed:    I concur with the findings and no changes have occurred since H&P was written.    Surgery risks, benefits and alternative options discussed and understood by patient/family.          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

## 2023-09-27 NOTE — OP NOTE
[facility] ([facility])  Surgery Department  Operative Note    SUMMARY     Date of Procedure: 9/25/2023     Procedure: Procedure(s) (LRB):  ORIF, HAND,LEFT (Left)     Surgeon(s) and Role:     * Crystal Tran MD - Primary    Assisting Surgeon: Sofía    Pre-Operative Diagnosis: Closed nondisplaced fracture of distal pole of scaphoid bone of left wrist, initial encounter [S62.015A]  Pain [R52]    Post-Operative Diagnosis: Post-Op Diagnosis Codes:     * Closed nondisplaced fracture of distal pole of scaphoid bone of left wrist, initial encounter [S62.015A]     * Pain [R52]    Anesthesia: Regional    Technical Procedures Used: SURGERY    Description of the Findings of the Procedure:  MR. BETTS IS A 33-YEAR-OLD MALE WHO SUSTAINED A DISTAL WAIST FRACTURE OF HIS LEFT SCAPHOID AFTER MUCH DISCUSSION WITH THE PATIENT ELECTED FOR SURGICAL INTERVENTION RISKS AND BENEFITS WERE EXPLAINED TO THE PATIENT IN CLINIC CONSENTS WERE SIGNED IN CLINIC THIS WAS FEW MONTHS OUT AND HE DOES USE NICOTINE WE DISCUSSED NO NICOTINE USE DURING HEALING PROCEDURE IN THE POSSIBILITY OF BONE GRAFT RISKS AND BENEFITS WERE EXPLAINED IN GREAT DETAIL CONSENT SIGNED IN CLINIC     PROCEDURE IN DETAIL THE CORRECT SITE WAS MARKED WITH THE PATIENT'S PARTICIPATION HOLDING AREA PATIENT UNDERWENT REGIONAL ANESTHESIA WAS BROUGHT TO THE OPERATING PLACED IN SUPINE POSITION UNDERWENT MAC ANESTHESIA WELL-PADDED NONSTERILE TOURNIQUET WAS PLACED ON THE LEFT UPPER EXTREMITY LEFT UPPER EXTREMITY WAS PREPPED AND DRAPED normal sterile fashion a time-out was conducted for the correct procedure to be indicated IV antibiotics were given to the patient preoperatively after the arm was prepped and draped was placed under C-arm the scaphoid was marked out the arm was exsanguinated with an Esmarch tourniquet was insufflated 250 mmHg a percutaneous pin was placed center center through the scaphoid was in such great position we elected to do a small incision at the  side there was debate about volar versus dorsal however this pin did was very center center and we could get enough compression across to the distal aspect of the fracture small incision was made gentle retraction was made the proximal pole was tapped and then overdrilled using the AcuTrak system prior to that it was measured so the size 20 screw would be ideal and the size 20 screw was then placed over the pin in position and good compression was achieved multiple x-rays were taken again this was center center areas irrigated copious amounts normal saline Monocryl closed the skin sterile dressing was applied patient was placed in a well-padded thumb spica splint tolerated suture was brought to cover room in stable condition     Postop plans patient keep the dressing clean dry intact see the patient back in 2 weeks' time cast to be placed bone stimulator to be started once wound healed of note we did discuss with the patient no nicotine use    Significant Surgical Tasks Conducted by the Assistant(s), if Applicable: RETRACTION    Complications: No    Estimated Blood Loss (EBL): * No values recorded between 9/25/2023 12:22 PM and 9/25/2023 12:50 PM *           Implants:   Implant Name Type Inv. Item Serial No.  Lot No. LRB No. Used Action   1.1 MM K WIRE    [employer]  Left 1 Implanted and Explanted   MINI ACUTRACK 20 SCREW    [employer]  Left 1 Implanted       Specimens:   Specimen (24h ago, onward)      None                    Condition: Good    Disposition: PACU - hemodynamically stable.    Attestation: I performed the procedure.    Discharge Note    SUMMARY     Admit Date: 9/25/2023    Discharge Date and Time: 9/25/2023  1:55 PM    Hospital Course (synopsis of major diagnoses, care, treatment, and services provided during the course of the hospital stay): SURGERY     Final Diagnosis: Post-Op Diagnosis Codes:     * Closed nondisplaced fracture of distal pole of scaphoid bone of left wrist, initial  encounter [S62.015A]     * Pain [R52]    Disposition: Home or Self Care    Follow Up/Patient Instructions:     Medications:  Reconciled Home Medications:      Medication List        CONTINUE taking these medications      acetaminophen 500 MG tablet  Commonly known as: TYLENOL  Take 2 tablets (1,000 mg total) by mouth 2 (two) times daily as needed for Pain. Begin post op day 3.     emtricitabine-tenofovir 200-300 mg 200-300 mg Tab  Commonly known as: TRUVADA  Take 1 tablet by mouth.     ibuprofen 600 MG tablet  Commonly known as: ADVIL,MOTRIN  Take 1 tablet (600 mg total) by mouth 3 (three) times daily as needed for Pain. PO delivery     oxyCODONE-acetaminophen 5-325 mg per tablet  Commonly known as: PERCOCET  Take 1 tablet by mouth every 4 to 6 hours as needed for Pain ((moderate-severe)). PO day 1-2            STOP taking these medications      gabapentin 100 MG capsule  Commonly known as: NEURONTIN            Discharge Procedure Orders   Diet general     Sponge bath only until clinic visit     Leave dressing on - Keep it clean, dry, and intact until clinic visit     Call MD for:  temperature >100.4     Call MD for:  persistent nausea and vomiting     Call MD for:  severe uncontrolled pain     Call MD for:  difficulty breathing, headache or visual disturbances     Call MD for:  redness, tenderness, or signs of infection (pain, swelling, redness, odor or green/yellow discharge around incision site)     Call MD for:  hives     Call MD for:  persistent dizziness or light-headedness     Call MD for:  extreme fatigue

## 2023-10-02 ENCOUNTER — TELEPHONE (OUTPATIENT)
Dept: URGENT CARE | Facility: CLINIC | Age: 33
End: 2023-10-02
Payer: COMMERCIAL

## 2023-10-06 ENCOUNTER — TELEPHONE (OUTPATIENT)
Dept: ORTHOPEDICS | Facility: CLINIC | Age: 33
End: 2023-10-06
Payer: COMMERCIAL

## 2023-10-09 ENCOUNTER — DOCUMENTATION ONLY (OUTPATIENT)
Dept: ORTHOPEDICS | Facility: CLINIC | Age: 33
End: 2023-10-09

## 2023-10-09 ENCOUNTER — OFFICE VISIT (OUTPATIENT)
Dept: ORTHOPEDICS | Facility: CLINIC | Age: 33
End: 2023-10-09
Payer: COMMERCIAL

## 2023-10-09 VITALS — BODY MASS INDEX: 18.21 KG/M2 | WEIGHT: 130.06 LBS | HEIGHT: 71 IN

## 2023-10-09 DIAGNOSIS — Z98.890 POST-OPERATIVE STATE: Primary | ICD-10-CM

## 2023-10-09 DIAGNOSIS — R52 PAIN: Primary | ICD-10-CM

## 2023-10-09 PROCEDURE — 99024 POSTOP FOLLOW-UP VISIT: CPT | Mod: S$GLB,,, | Performed by: PHYSICIAN ASSISTANT

## 2023-10-09 PROCEDURE — 99999 PR PBB SHADOW E&M-EST. PATIENT-LVL III: CPT | Mod: PBBFAC,,, | Performed by: PHYSICIAN ASSISTANT

## 2023-10-09 PROCEDURE — 99024 PR POST-OP FOLLOW-UP VISIT: ICD-10-PCS | Mod: S$GLB,,, | Performed by: PHYSICIAN ASSISTANT

## 2023-10-09 PROCEDURE — 99999 PR PBB SHADOW E&M-EST. PATIENT-LVL III: ICD-10-PCS | Mod: PBBFAC,,, | Performed by: PHYSICIAN ASSISTANT

## 2023-10-09 NOTE — PROGRESS NOTES
"Mr. Linn is here today for a post-operative visit.  He is 14 days status post ORIF L Scaphoid Fracture by Dr. Tran on 9/25/23. He reports that he is well.  Pain is minimal. He has not had to take any pain medications since two days post op. He denies fever, chills, and sweats since the time of the surgery.     Physical exam:    Vitals:    10/09/23 1514   Weight: 59 kg (130 lb 1.1 oz)   Height: 5' 11" (1.803 m)   PainSc: 0-No pain     Vital signs are stable, patient is afebrile.  Patient is well dressed and well groomed, no acute distress.  Alert and oriented to person, place, and time.  Post op dressing taken down.  Incision is clean, dry and intact.  There is no erythema or exudate.  There is no sign of any infection. Mild skin maceration noted in palm. He is NVI.     Assessment: status post ORIF L Scaphoid Fracture by Dr. Tran on 9/25/23    Plan:  Genaro was seen today for post-op evaluation.    Diagnoses and all orders for this visit:    Post-operative state    PO instruction reviewed and provided to patient  Transition to left Thumb spica fiberglass cast  Will send msg re: bone stim   RTC in 4 weeks with XR        "

## 2023-10-10 ENCOUNTER — DOCUMENTATION ONLY (OUTPATIENT)
Dept: ORTHOPEDICS | Facility: CLINIC | Age: 33
End: 2023-10-10

## 2023-10-16 ENCOUNTER — TELEPHONE (OUTPATIENT)
Dept: ORTHOPEDICS | Facility: CLINIC | Age: 33
End: 2023-10-16
Payer: COMMERCIAL

## 2023-10-16 NOTE — TELEPHONE ENCOUNTER
Spoke c Pt. Confirmed appt. location & time on 10/18/23, [facility].  Pt. expressed understanding & was thankful.

## 2023-10-18 ENCOUNTER — OFFICE VISIT (OUTPATIENT)
Dept: ORTHOPEDICS | Facility: CLINIC | Age: 33
End: 2023-10-18
Payer: COMMERCIAL

## 2023-10-18 ENCOUNTER — DOCUMENTATION ONLY (OUTPATIENT)
Dept: ORTHOPEDICS | Facility: CLINIC | Age: 33
End: 2023-10-18

## 2023-10-18 VITALS
BODY MASS INDEX: 18.2 KG/M2 | HEART RATE: 55 BPM | HEIGHT: 71 IN | SYSTOLIC BLOOD PRESSURE: 101 MMHG | WEIGHT: 130 LBS | DIASTOLIC BLOOD PRESSURE: 60 MMHG

## 2023-10-18 DIAGNOSIS — Z98.890 POST-OPERATIVE STATE: Primary | ICD-10-CM

## 2023-10-18 PROCEDURE — 29075 APPL CST ELBW FNGR SHORT ARM: CPT | Mod: 58,LT,S$GLB, | Performed by: PHYSICIAN ASSISTANT

## 2023-10-18 PROCEDURE — 99024 PR POST-OP FOLLOW-UP VISIT: ICD-10-PCS | Mod: S$GLB,,, | Performed by: PHYSICIAN ASSISTANT

## 2023-10-18 PROCEDURE — 99999 PR PBB SHADOW E&M-EST. PATIENT-LVL III: CPT | Mod: PBBFAC,,, | Performed by: PHYSICIAN ASSISTANT

## 2023-10-18 PROCEDURE — 99024 POSTOP FOLLOW-UP VISIT: CPT | Mod: S$GLB,,, | Performed by: PHYSICIAN ASSISTANT

## 2023-10-18 PROCEDURE — 29075 PR APPLY FOREARM CAST: ICD-10-PCS | Mod: 58,LT,S$GLB, | Performed by: PHYSICIAN ASSISTANT

## 2023-10-18 PROCEDURE — 99999 PR PBB SHADOW E&M-EST. PATIENT-LVL III: ICD-10-PCS | Mod: PBBFAC,,, | Performed by: PHYSICIAN ASSISTANT

## 2023-10-18 NOTE — PROGRESS NOTES
Pt presents for cast change.  he is immobilized in a thumb spica cast. He is status post ORIF L Scaphoid Fracture by Dr. Tran on 9/25/23. He reports sweating under the cast he sweats in his sleep. Otherwise doing well.     New L thumb spica fiberglass cast applied   RTC for regular PO, sooner if needed

## 2023-10-23 ENCOUNTER — TELEPHONE (OUTPATIENT)
Dept: ORTHOPEDICS | Facility: CLINIC | Age: 33
End: 2023-10-23
Payer: COMMERCIAL

## 2023-10-23 DIAGNOSIS — M25.532 LEFT WRIST PAIN: Primary | ICD-10-CM

## 2023-10-23 NOTE — TELEPHONE ENCOUNTER
Spoke c Pt. Confirmed appt. location & time on 10/26/23, for cast change only. New appts. for 6 wk f/u c XR CAST OFF scheduled on 11/06/23. Pt. expressed understanding & was thankful.

## 2023-10-26 ENCOUNTER — DOCUMENTATION ONLY (OUTPATIENT)
Dept: ORTHOPEDICS | Facility: CLINIC | Age: 33
End: 2023-10-26

## 2023-10-31 ENCOUNTER — PATIENT MESSAGE (OUTPATIENT)
Dept: ORTHOPEDICS | Facility: CLINIC | Age: 33
End: 2023-10-31
Payer: COMMERCIAL

## 2023-11-06 ENCOUNTER — OFFICE VISIT (OUTPATIENT)
Dept: ORTHOPEDICS | Facility: CLINIC | Age: 33
End: 2023-11-06
Payer: COMMERCIAL

## 2023-11-06 ENCOUNTER — HOSPITAL ENCOUNTER (OUTPATIENT)
Dept: RADIOLOGY | Facility: OTHER | Age: 33
Discharge: HOME | End: 2023-11-06
Attending: PHYSICIAN ASSISTANT
Payer: COMMERCIAL

## 2023-11-06 VITALS — HEIGHT: 71 IN | WEIGHT: 130.06 LBS | BODY MASS INDEX: 18.21 KG/M2

## 2023-11-06 DIAGNOSIS — Z98.890 POST-OPERATIVE STATE: Primary | ICD-10-CM

## 2023-11-06 DIAGNOSIS — M25.532 LEFT WRIST PAIN: ICD-10-CM

## 2023-11-06 DIAGNOSIS — M25.532 LEFT WRIST PAIN: Primary | ICD-10-CM

## 2023-11-06 PROCEDURE — 73110 X-RAY EXAM OF WRIST: CPT | Mod: 26,LT,, | Performed by: RADIOLOGY

## 2023-11-06 PROCEDURE — 99024 PR POST-OP FOLLOW-UP VISIT: ICD-10-PCS | Mod: S$GLB,,, | Performed by: PHYSICIAN ASSISTANT

## 2023-11-06 PROCEDURE — 99999 PR PBB SHADOW E&M-EST. PATIENT-LVL III: CPT | Mod: PBBFAC,,, | Performed by: PHYSICIAN ASSISTANT

## 2023-11-06 PROCEDURE — 73110 XR WRIST COMPLETE 3 VIEWS LEFT: ICD-10-PCS | Mod: 26,LT,, | Performed by: RADIOLOGY

## 2023-11-06 PROCEDURE — 73110 X-RAY EXAM OF WRIST: CPT | Mod: TC,FY,LT

## 2023-11-06 PROCEDURE — 99024 POSTOP FOLLOW-UP VISIT: CPT | Mod: S$GLB,,, | Performed by: PHYSICIAN ASSISTANT

## 2023-11-06 PROCEDURE — 99999 PR PBB SHADOW E&M-EST. PATIENT-LVL III: ICD-10-PCS | Mod: PBBFAC,,, | Performed by: PHYSICIAN ASSISTANT

## 2023-11-06 NOTE — PROGRESS NOTES
"Mr. Linn is here today for a post-operative visit.  He is 6 weeks status post ORIF L Scaphoid Fracture by Dr. Tran on 9/25/23. He reports that he is well. He reports compliance with bone stimulator use. Pain is minimal. He denies fever, chills, and sweats since the time of the surgery.     Physical exam:    Vitals:    11/06/23 1357   Weight: 59 kg (130 lb 1.1 oz)   Height: 5' 11" (1.803 m)   PainSc: 0-No pain     Vital signs are stable, patient is afebrile.  Patient is well dressed and well groomed, no acute distress.  Alert and oriented to person, place, and time.  Cast removed. Incision is well healed. There is no erythema or exudate.  There is no sign of any infection. He is NVI. Good finger motion.    Assessment: status post ORIF L Scaphoid Fracture by Dr. Tran on 9/25/23    Plan:  Genaro was seen today for post-op evaluation.    Diagnoses and all orders for this visit:    Post-operative state  -     Ambulatory referral/consult to Physical/Occupational Therapy; Future      PO instruction reviewed and provided to patient  Therapy ordered   Transition to left thumb spica exos discussed use   Continue bone stimulator   RTC 4 wks with xray         "

## 2023-11-10 ENCOUNTER — OFFICE VISIT (OUTPATIENT)
Dept: URGENT CARE | Facility: CLINIC | Age: 33
End: 2023-11-10
Payer: COMMERCIAL

## 2023-11-10 VITALS
BODY MASS INDEX: 17.92 KG/M2 | RESPIRATION RATE: 18 BRPM | HEART RATE: 73 BPM | SYSTOLIC BLOOD PRESSURE: 108 MMHG | HEIGHT: 71 IN | TEMPERATURE: 98 F | OXYGEN SATURATION: 97 % | DIASTOLIC BLOOD PRESSURE: 69 MMHG | WEIGHT: 128 LBS

## 2023-11-10 DIAGNOSIS — L53.9 ERYTHEMA OF SKIN OF EYELID: ICD-10-CM

## 2023-11-10 DIAGNOSIS — H02.844 SWELLING OF LEFT UPPER EYELID: ICD-10-CM

## 2023-11-10 DIAGNOSIS — H00.024 HORDEOLUM INTERNUM LEFT UPPER EYELID: Primary | ICD-10-CM

## 2023-11-10 PROCEDURE — 99213 PR OFFICE/OUTPT VISIT, EST, LEVL III, 20-29 MIN: ICD-10-PCS | Mod: S$GLB,,, | Performed by: NURSE PRACTITIONER

## 2023-11-10 PROCEDURE — 99213 OFFICE O/P EST LOW 20 MIN: CPT | Mod: S$GLB,,, | Performed by: NURSE PRACTITIONER

## 2023-11-10 RX ORDER — CETIRIZINE HYDROCHLORIDE 10 MG/1
10 TABLET ORAL DAILY PRN
Qty: 30 TABLET | Refills: 0 | Status: SHIPPED | OUTPATIENT
Start: 2023-11-10

## 2023-11-10 RX ORDER — POLYMYXIN B SULFATE AND TRIMETHOPRIM 1; 10000 MG/ML; [USP'U]/ML
1 SOLUTION OPHTHALMIC EVERY 4 HOURS
Qty: 10 ML | Refills: 0 | Status: SHIPPED | OUTPATIENT
Start: 2023-11-10 | End: 2023-11-17

## 2023-11-10 NOTE — PROGRESS NOTES
"Subjective:      Patient ID: Genaro Linn is a 33 y.o. male.    Vitals:  height is 5' 11" (1.803 m) and weight is 58.1 kg (128 lb). His oral temperature is 97.8 °F (36.6 °C). His blood pressure is 108/69 and his pulse is 73. His respiration is 18 and oxygen saturation is 97%.     Chief Complaint: Eye Problem    Patient first noticed eye issue 3 days ago. Patient been using warm compress the last 2 days without help.    33 year old male presents to clinic with complaints of left upper eyelid swelling and bump x 3 days after using OTC stye drops and warm compress without improvement. Past history of stye approximately 2 months ago to same eyelid    Eye Problem   The left eye is affected. This is a recurrent problem. The current episode started in the past 7 days. The problem occurs constantly. The problem has been gradually worsening. The pain is at a severity of 8/10. The pain is moderate. There is No known exposure to pink eye. He Does not wear contacts. Associated symptoms include an eye discharge and eye redness. Pertinent negatives include no blurred vision, double vision, fever or photophobia. He has tried eye drops for the symptoms.       Constitution: Negative for fever.   Eyes:  Positive for eye discharge, eye redness and eyelid swelling. Negative for foreign body in eye, eye pain, photophobia, vision loss, double vision and blurred vision.   Allergic/Immunologic: Negative for environmental allergies, seasonal allergies, food allergies and itching.      Objective:     Physical Exam   Constitutional: He is oriented to person, place, and time. He appears well-developed.   HENT:   Head: Normocephalic and atraumatic.   Ears:   Right Ear: External ear normal.   Left Ear: External ear normal.   Nose: Nose normal.   Mouth/Throat: Oropharynx is clear and moist.   Eyes: EOM and lids are normal. Pupils are equal, round, and reactive to light. Lids are everted and swept, no foreign bodies found. Left eye exhibits " hordeolum. Left eye exhibits no exudate. Left conjunctiva is injected. Extraocular movement intact vision grossly intact gaze aligned appropriately   Neck: Trachea normal and phonation normal. Neck supple.   Cardiovascular: Normal rate.   Pulmonary/Chest: Effort normal.   Musculoskeletal: Normal range of motion.         General: Normal range of motion.   Neurological: He is alert and oriented to person, place, and time.   Skin: Skin is warm, dry and intact.   Psychiatric: His speech is normal and behavior is normal. Judgment and thought content normal.   Nursing note and vitals reviewed.      Assessment:     1. Hordeolum internum left upper eyelid    2. Swelling of left upper eyelid    3. Erythema of skin of eyelid        Plan:       Hordeolum internum left upper eyelid  -     polymyxin B sulf-trimethoprim (POLYTRIM) 10,000 unit- 1 mg/mL Drop; Place 1 drop into the left eye every 4 (four) hours. for 7 days  Dispense: 10 mL; Refill: 0    Swelling of left upper eyelid  -     polymyxin B sulf-trimethoprim (POLYTRIM) 10,000 unit- 1 mg/mL Drop; Place 1 drop into the left eye every 4 (four) hours. for 7 days  Dispense: 10 mL; Refill: 0    Erythema of skin of eyelid  -     polymyxin B sulf-trimethoprim (POLYTRIM) 10,000 unit- 1 mg/mL Drop; Place 1 drop into the left eye every 4 (four) hours. for 7 days  Dispense: 10 mL; Refill: 0    Other orders  -     cetirizine (ZYRTEC) 10 MG tablet; Take 1 tablet (10 mg total) by mouth daily as needed for Allergies.  Dispense: 30 tablet; Refill: 0      Vision Screening    Right eye Left eye Both eyes   Without correction 20/20 20/15 20/20   With correction         Patient Instructions   Use a warm compress to the eye for 15 minutes 4 times a day to help drainage and ease discomfort. The compress should be as warm as you can tolerate comfortably. Be careful not to burn your eye.  Do not squeeze or try to drain or pop a stye. This can infect the whole eyelid or other parts of the eye.  Be  sure to wash your hands before and after touching your eye.  Wear glasses instead of contact lens while you have a stye. Ask your doctor if you need to sterilize your contact lens or get new ones after you have a stye.  Please return here or go to the Emergency Department for any concerns or worsening of condition.  You were prescribed antibiotics, please take them as directed.  Please follow up with your primary care doctor or specialist as needed.    If you  smoke, please stop smoking.

## 2023-11-10 NOTE — PATIENT INSTRUCTIONS
Use a warm compress to the eye for 15 minutes 4 times a day to help drainage and ease discomfort. The compress should be as warm as you can tolerate comfortably. Be careful not to burn your eye.  Do not squeeze or try to drain or pop a stye. This can infect the whole eyelid or other parts of the eye.  Be sure to wash your hands before and after touching your eye.  Wear glasses instead of contact lens while you have a stye. Ask your doctor if you need to sterilize your contact lens or get new ones after you have a stye.  Please return here or go to the Emergency Department for any concerns or worsening of condition.  You were prescribed antibiotics, please take them as directed.  Please follow up with your primary care doctor or specialist as needed.    If you  smoke, please stop smoking.

## 2023-11-13 ENCOUNTER — CLINICAL SUPPORT (OUTPATIENT)
Dept: REHABILITATION | Facility: HOSPITAL | Age: 33
End: 2023-11-13
Payer: COMMERCIAL

## 2023-11-13 DIAGNOSIS — M25.642 STIFFNESS OF LEFT HAND JOINT: Primary | ICD-10-CM

## 2023-11-13 DIAGNOSIS — Z98.890 POST-OPERATIVE STATE: ICD-10-CM

## 2023-11-13 DIAGNOSIS — R29.898 WEAKNESS OF LEFT HAND: ICD-10-CM

## 2023-11-13 PROCEDURE — 97530 THERAPEUTIC ACTIVITIES: CPT

## 2023-11-13 PROCEDURE — 97165 OT EVAL LOW COMPLEX 30 MIN: CPT

## 2023-11-13 NOTE — PATIENT INSTRUCTIONS
"[facility], OCCUPATIONAL THERAPY  HOME EXERCISE PROGRAM     Complete the following massage for 3 minutes, 3x/day:                                  Massage with lotion and medium pressure on and around scars in circles, side to side and up and down.    Complete the following exercises for 10 repetitions, 4x/day:     AROM: Wrist Flexion / Extension               Bend your wrist forward and back as far as possible.        AROM: Wrist Radial / Ulnar Deviation  Bend your wrist from side to side as far as possible.      AROM: Composite Flexion   Bend both joints of thumb as far as possible.   Try to touch base of little finger.      AROM: Thumb IP Flexion / Extension  Brace thumb below tip joint. Bend joint as far as   possible then straighten.      AROM: Palmar Adduction / Abduction   Rest your small finger on the table. Move thumb   sideways, out and away from   palm. Move back to rest along palm.      AROM: Radial Adduction / Abduction  Place your palm flat on the table. Move thumb out   to side. Move back alongside index finger.                                               AROM: Composite Flesion ("Pinky Slides")  Touch thumb to tip of small finger. Slide thumb down   small finger into palm.       Therapist: Maggie Boasberg, OTR/CHT        "
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
## 2023-11-19 ENCOUNTER — ON-DEMAND VIRTUAL (OUTPATIENT)
Dept: URGENT CARE | Facility: CLINIC | Age: 33
End: 2023-11-19
Payer: COMMERCIAL

## 2023-11-19 DIAGNOSIS — H02.844 PAIN AND SWELLING OF UPPER EYELID OF LEFT EYE: Primary | ICD-10-CM

## 2023-11-19 DIAGNOSIS — H02.89 PAIN AND SWELLING OF UPPER EYELID OF LEFT EYE: Primary | ICD-10-CM

## 2023-11-19 PROCEDURE — 99213 OFFICE O/P EST LOW 20 MIN: CPT | Mod: 95,,, | Performed by: NURSE PRACTITIONER

## 2023-11-19 PROCEDURE — 99213 PR OFFICE/OUTPT VISIT, EST, LEVL III, 20-29 MIN: ICD-10-PCS | Mod: 95,,, | Performed by: NURSE PRACTITIONER

## 2023-11-19 RX ORDER — ERYTHROMYCIN 5 MG/G
OINTMENT OPHTHALMIC
Qty: 1 G | Refills: 0 | Status: SHIPPED | OUTPATIENT
Start: 2023-11-19

## 2023-11-19 NOTE — PROGRESS NOTES
Subjective:      Patient ID: Genaro Linn is a 33 y.o. male.    Vitals:  vitals were not taken for this visit.     Chief Complaint: Eye Problem      Visit Type: TELE AUDIOVISUAL    Present with the patient at the time of consultation: TELEMED PRESENT WITH PATIENT: None    Past Medical History:   Diagnosis Date    Chronic tonsillitis     Diverticulitis      Past Surgical History:   Procedure Laterality Date    OPEN REDUCTION AND INTERNAL FIXATION (ORIF) OF INJURY OF HAND Left 09/25/2023    Procedure: ORIF, HAND,LEFT;  Surgeon: Crystal Tran MD;  Location: [facility];  Service: Orthopedics;  Laterality: Left;  MAC/REGIONAL    TONSILLECTOMY      TONSILLECTOMY AND ADENOIDECTOMY Bilateral      Review of patient's allergies indicates:  No Known Allergies  Current Outpatient Medications on File Prior to Visit   Medication Sig Dispense Refill    cetirizine (ZYRTEC) 10 MG tablet Take 1 tablet (10 mg total) by mouth daily as needed for Allergies. 30 tablet 0    [DISCONTINUED] acetaminophen (TYLENOL) 500 MG tablet Take 2 tablets (1,000 mg total) by mouth 2 (two) times daily as needed for Pain. Begin post op day 3. (Patient not taking: Reported on 11/6/2023) 50 tablet 0    [DISCONTINUED] emtricitabine-tenofovir 200-300 mg (TRUVADA) 200-300 mg Tab Take 1 tablet by mouth.      [DISCONTINUED] ibuprofen (ADVIL,MOTRIN) 600 MG tablet Take 1 tablet (600 mg total) by mouth 3 (three) times daily as needed for Pain. PO delivery (Patient not taking: Reported on 11/6/2023) 45 tablet 0    [DISCONTINUED] oxyCODONE-acetaminophen (PERCOCET) 5-325 mg per tablet Take 1 tablet by mouth every 4 to 6 hours as needed for Pain ((moderate-severe)). PO day 1-2 (Patient not taking: Reported on 11/6/2023) 10 tablet 0     Current Facility-Administered Medications on File Prior to Visit   Medication Dose Route Frequency Provider Last Rate Last Admin    fentaNYL 50 mcg/mL injection  mcg   mcg Intravenous PRN Bogdan Mcconnell MD   50  mcg at 23 1100    midazolam (VERSED) 1 mg/mL injection 0.5-4 mg  0.5-4 mg Intravenous PRN Bogdan Mcconnell MD   2 mg at 23 1054    mupirocin 2 % ointment   Nasal On Call Procedure Osbaldo Liu MD   Given at 23 1039     Family History   Problem Relation Age of Onset    No Known Problems Mother     No Known Problems Father     No Known Problems Sister     Cancer Paternal Grandmother     Heart attack Maternal Grandmother     Cancer Maternal Grandmother     Cancer Paternal Grandfather        Medications Ordered                [id number] - [place], [place] - [address] [facility]   [address], [place] [address]    Telephone: [phone]   Fax: [phone]   Hours: Not open 24 hours                         E-Prescribed (1 of 1)              erythromycin (ROMYCIN) ophthalmic ointment    Si cm ribbon to eye(s) 2-3 times per day, may use up to 6 times per day for 7-10 days       Start: 23     Quantity: 1 g Refills: 0                           Ohs Peq Odvv Intake    2023  8:40 AM CST - Filed by Patient   Describe your reason for todays visit Stye   What is your current physical address in the event of a medical emergency? [address]   Are you able to take your vital signs? No   Please attach any relevant images or files          Seen last Friday for the same. Left eyelid swelling and drainage. Seen in UC and put on Polymyxin and Zyrtec. No relief with meds. No palpable nodule. No vision changes. No other associated symptoms to report.    Eye Problem   Associated symptoms include an eye discharge and eye redness. Pertinent negatives include no fever.       Constitution: Negative for chills and fever.   HENT:  Negative for congestion.    Eyes:  Positive for eye discharge, eye pain, eye redness and eyelid swelling. Negative for eye trauma and vision loss.   Allergic/Immunologic: Negative for sneezing.         Objective:   The physical exam was conducted virtually.  Physical Exam   Constitutional: He is oriented to person, place, and time. He does not appear ill. No distress.   HENT:   Head: Normocephalic and atraumatic.   Nose: Nose normal.   Eyes: Left eye exhibits discharge. Left eye exhibits no hordeolum. No foreign body present in the left eye. Left eye exhibits normal extraocular motion. Extraocular movement intact   Pulmonary/Chest: Effort normal.   Abdominal: Normal appearance.   Musculoskeletal: Normal range of motion.         General: Normal range of motion.   Neurological: no focal deficit. He is alert and oriented to person, place, and time.   Psychiatric: His behavior is normal. Mood normal.   Vitals reviewed.      Assessment:     1. Pain and swelling of upper eyelid of left eye        Plan:     Patient encouraged to monitor symptoms closely and instructed to follow-up for new or worsening symptoms. Further, in-person, evaluation may be necessary for continued treatment. Please follow up with your primary care doctor or specialist as needed. Verbally discussed plan. Patient confirms understanding and is in agreement with treatment and plan.     You must understand that you've received a [facility] Care evaluation only and that you may be released before all your medical problems are known or treated. You, the patient, will arrange for follow up care as instructed.    Pain and swelling of upper eyelid of left eye  -     erythromycin (ROMYCIN) ophthalmic ointment; 1 cm ribbon to eye(s) 2-3 times per day, may use up to 6 times per day for 7-10 days  Dispense: 1 g; Refill: 0        Patient Instructions   Recommendations:     . Do not rub eyes. Wash hands frequently and disinfect common surfaces to avoid spread.     . Warm or cool compresses may be soothing.     . Artificial tears to help lubricate and rinse the eye. Refrigerated drops may be soothing as well.         Blepharitis   The Basics   Written by the  doctors and editors at [facility]   What is blepharitis? -- Blepharitis is inflammation of the eyelids that causes redness and swelling of the lids. The symptoms might get better and then come back. But blepharitis rarely causes problems with your vision.  Blepharitis is more common in people who have certain skin conditions, including:  Rosacea - This causes redness and raised, red bumps on the cheeks, nose, chin, forehead, or eyelids.  Seborrhea - This causes redness, scaly patches, and itching, mostly on the scalp. Dandruff is a mild form of seborrhea.  What are the symptoms of blepharitis? -- The symptoms include:  Eyelids that are red, swollen, and itchy  A gritty or burning feeling in the eyes  Red eyes  Crusty, matted eyelashes in the morning  Flaking or scaling of the eyelid skin (picture 1)  Is there a test for blepharitis? -- No. There is no test. But your doctor or nurse should be able to tell if you have it by learning about your symptoms and doing an exam.  Is there anything I can do on my own to feel better? -- Yes. You can:  Put warm, wet pressure on your eyes - Wet a clean wash cloth with warm (not scalding hot) water and put it over your eyes. When the wash cloth cools, reheat it with warm water and put it back over your eyes. Repeat these steps for 5 minutes, 2 to 4 times a day.  Gently rub your eyelids - Do this right after putting warm, wet pressure on your eyes. Use the washcloth or a clean fingertip to gently rub your eyelid in small circles.  Wash your eyelids - Use plain warm water or warm water with a drop of baby shampoo on a clean washcloth, gauze pad, or cotton swab. Gently clean any crusty material off the eyelashes and eyelids. Do not rub hard or you can cause more irritation. You can also use over-the-counter eyelid scrubs and pads.  How is blepharitis treated? -- If the treatments you do on your own do not help, your doctor might prescribe:  An antibiotic cream or ointment to put on  your eyelids  Antibiotic pills  All topics are updated as new evidence becomes available and our peer review process is complete.  This topic retrieved from BetterDoctor on: Sep 21, 2021.  Topic 68544 Version 15.0  Release: 29.4.2 - C29.263  © 2021 UpToDate, Inc. and/or its affiliates. All rights reserved.  picture 1: Blepharitis     Blepharitis is an inflammation of the eyelids that causes redness and swelling. The skin of the eyelids might also scale or flake, as in this picture.  Graphic 67319 Version 1.0    Consumer Information Use and Disclaimer   This information is not specific medical advice and does not replace information you receive from your health care provider. This is only a brief summary of general information. It does NOT include all information about conditions, illnesses, injuries, tests, procedures, treatments, therapies, discharge instructions or life-style choices that may apply to you. You must talk with your health care provider for complete information about your health and treatment options. This information should not be used to decide whether or not to accept your health care provider's advice, instructions or recommendations. Only your health care provider has the knowledge and training to provide advice that is right for you. The use of this information is governed by the [employer] End User License Agreement, available at https://www.SquareHook.Meusonic/en/solutions/Headplay/about/dianne.The use of BetterDoctor content is governed by the BetterDoctor Terms of Use. ©2021 UpToDate, Inc. All rights reserved.  Copyright   © 2021 UpToDate, Inc. and/or its affiliates. All rights reserved.

## 2023-11-19 NOTE — PATIENT INSTRUCTIONS
Recommendations:     . Do not rub eyes. Wash hands frequently and disinfect common surfaces to avoid spread.     . Warm or cool compresses may be soothing.     . Artificial tears to help lubricate and rinse the eye. Refrigerated drops may be soothing as well.         Blepharitis   The Basics   Written by the doctors and editors at [facility]   What is blepharitis? -- Blepharitis is inflammation of the eyelids that causes redness and swelling of the lids. The symptoms might get better and then come back. But blepharitis rarely causes problems with your vision.  Blepharitis is more common in people who have certain skin conditions, including:  Rosacea - This causes redness and raised, red bumps on the cheeks, nose, chin, forehead, or eyelids.  Seborrhea - This causes redness, scaly patches, and itching, mostly on the scalp. Dandruff is a mild form of seborrhea.  What are the symptoms of blepharitis? -- The symptoms include:  Eyelids that are red, swollen, and itchy  A gritty or burning feeling in the eyes  Red eyes  Crusty, matted eyelashes in the morning  Flaking or scaling of the eyelid skin (picture 1)  Is there a test for blepharitis? -- No. There is no test. But your doctor or nurse should be able to tell if you have it by learning about your symptoms and doing an exam.  Is there anything I can do on my own to feel better? -- Yes. You can:  Put warm, wet pressure on your eyes - Wet a clean wash cloth with warm (not scalding hot) water and put it over your eyes. When the wash cloth cools, reheat it with warm water and put it back over your eyes. Repeat these steps for 5 minutes, 2 to 4 times a day.  Gently rub your eyelids - Do this right after putting warm, wet pressure on your eyes. Use the washcloth or a clean fingertip to gently rub your eyelid in small circles.  Wash your eyelids - Use plain warm water or warm water with a drop of baby shampoo on a clean washcloth, gauze pad, or cotton swab. Gently clean any  crusty material off the eyelashes and eyelids. Do not rub hard or you can cause more irritation. You can also use over-the-counter eyelid scrubs and pads.  How is blepharitis treated? -- If the treatments you do on your own do not help, your doctor might prescribe:  An antibiotic cream or ointment to put on your eyelids  Antibiotic pills  All topics are updated as new evidence becomes available and our peer review process is complete.  This topic retrieved from BetterWorks (Closed) on: Sep 21, 2021.  Topic 17710 Version 15.0  Release: 29.4.2 - C29.263  © 2021 UpToDate, Inc. and/or its affiliates. All rights reserved.  picture 1: Blepharitis     Blepharitis is an inflammation of the eyelids that causes redness and swelling. The skin of the eyelids might also scale or flake, as in this picture.  Graphic 56524 Version 1.0    Consumer Information Use and Disclaimer   This information is not specific medical advice and does not replace information you receive from your health care provider. This is only a brief summary of general information. It does NOT include all information about conditions, illnesses, injuries, tests, procedures, treatments, therapies, discharge instructions or life-style choices that may apply to you. You must talk with your health care provider for complete information about your health and treatment options. This information should not be used to decide whether or not to accept your health care provider's advice, instructions or recommendations. Only your health care provider has the knowledge and training to provide advice that is right for you. The use of this information is governed by the [employer] End User License Agreement, available at https://www.TableGrabber.Savedaily/en/solutions/Salesconx/about/dianne.The use of BetterWorks (Closed) content is governed by the BetterWorks (Closed) Terms of Use. ©2021 UpToDate, Inc. All rights reserved.  Copyright   © 2021 UpToDate, Inc. and/or its affiliates. All rights reserved.

## 2023-11-21 ENCOUNTER — TELEPHONE (OUTPATIENT)
Dept: OPHTHALMOLOGY | Facility: CLINIC | Age: 33
End: 2023-11-21
Payer: COMMERCIAL

## 2023-11-21 ENCOUNTER — OFFICE VISIT (OUTPATIENT)
Dept: OPTOMETRY | Facility: CLINIC | Age: 33
End: 2023-11-21
Payer: COMMERCIAL

## 2023-11-21 DIAGNOSIS — L03.213 PRESEPTAL CELLULITIS OF RIGHT UPPER EYELID: Primary | ICD-10-CM

## 2023-11-21 PROCEDURE — 99203 PR OFFICE/OUTPT VISIT, NEW, LEVL III, 30-44 MIN: ICD-10-PCS | Mod: S$GLB,,, | Performed by: OPTOMETRIST

## 2023-11-21 PROCEDURE — 99999 PR PBB SHADOW E&M-EST. PATIENT-LVL II: ICD-10-PCS | Mod: PBBFAC,,, | Performed by: OPTOMETRIST

## 2023-11-21 PROCEDURE — 99999 PR PBB SHADOW E&M-EST. PATIENT-LVL II: CPT | Mod: PBBFAC,,, | Performed by: OPTOMETRIST

## 2023-11-21 PROCEDURE — 99203 OFFICE O/P NEW LOW 30 MIN: CPT | Mod: S$GLB,,, | Performed by: OPTOMETRIST

## 2023-11-21 RX ORDER — CEPHALEXIN 500 MG/1
500 CAPSULE ORAL 2 TIMES DAILY
Qty: 20 CAPSULE | Refills: 0 | Status: SHIPPED | OUTPATIENT
Start: 2023-11-21 | End: 2023-12-01

## 2023-11-21 NOTE — TELEPHONE ENCOUNTER
----- Message from Catarino Hoskins sent at 11/21/2023  9:56 AM CST -----  Contact: [phone]  Pt is calling because he has a stye that is not going away and getting worse. States the cream he is taking is not working and is trying to see if he can be seen today or tomorrow by anyone. Please call back to further assist.

## 2023-11-21 NOTE — PROGRESS NOTES
Subjective:       Patient ID: Genaro Linn is a 33 y.o. male      Chief Complaint   Patient presents with    Eye Problem     History of Present Illness  Dls: 1 yr     34 y/o male presents today with c/o x 2 weeks had a bump on BRODY was seen in urgent care was given polymyxin gtts os Q 2 hrs no improvement and started to get worse pt was seen back in urgent care and was given erythromycin kathy os qid for 3 days no improvement. Pt c/o swollen BRODY pain 7-8 os photophobia os blurry vision os  no tearing  No mucous. Pt using warm and col compresses.        Assessment/Plan:     1. Preseptal cellulitis of right upper eyelid  Keflex PO BID x 10 days. Stop abx drops and romycin kathy. Warm comrpresses BID x 5-10 minutes. No signs of orbital cellulitis at this time. Advised if unable to open or move eye to report to [facility] ED.   - cephALEXin (KEFLEX) 500 MG capsule; Take 1 capsule (500 mg total) by mouth 2 (two) times daily. for 10 days  Dispense: 20 capsule; Refill: 0    Follow up if symptoms worsen or fail to improve.

## 2023-11-27 ENCOUNTER — CLINICAL SUPPORT (OUTPATIENT)
Dept: REHABILITATION | Facility: HOSPITAL | Age: 33
End: 2023-11-27
Payer: COMMERCIAL

## 2023-11-27 DIAGNOSIS — R29.898 WEAKNESS OF LEFT HAND: ICD-10-CM

## 2023-11-27 DIAGNOSIS — M25.642 STIFFNESS OF LEFT HAND JOINT: Primary | ICD-10-CM

## 2023-11-27 PROCEDURE — 97530 THERAPEUTIC ACTIVITIES: CPT

## 2023-11-27 PROCEDURE — 97140 MANUAL THERAPY 1/> REGIONS: CPT

## 2023-11-27 PROCEDURE — 97018 PARAFFIN BATH THERAPY: CPT

## 2023-11-27 NOTE — PROGRESS NOTES
Occupational Therapy Treatment Note     Date: 11/27/2023  Name: Genaro Linn  [facility] Number: [id number]    Therapy Diagnosis:   Encounter Diagnoses   Name Primary?    Stiffness of left hand joint Yes    Weakness of left hand      Physician: Maria Elias PA-C    Physician Orders: Eval and Treat  Medical Diagnosis:   Z98.890 (ICD-10-CM) - Post-operative state      Surgical Procedure and Date: L scaphoid ORIF, 9/25/23  Evaluation Date: 11/13/2023  Insurance Authorization Period Expiration: 12/31/23  Plan of Care Certification Period: 2/9/24  Date of Return to MD: 12/4/23  Visit # / Visits authorized: 1 / 1    Time In: 3:00 pm  Time Out: 3:45 pm  Total Billable Time: 45 minutes    Precautions:  Standard      Subjective     Pt reports: he has soreness around wrist  he was compliant with home exercise program given last session.   Response to previous treatment: tolerated well    Pain: 0/10  Location: left wrists      OBJECTIVE     Observation/Appearance: pt arrived wearing OTC exos splint; no apparent edema; healed incision on dorsal radial wrist     Edema. Measured in centimeters.    11/13/2023 11/13/2023         Left Right       Wrist Crease 16.7 16       Distal Palmar Crease 18.8 19.8       Thumb P1 6.7 6.3             Elbow and Wrist ROM. Measured in degrees.    11/13/2023 11/13/2023 11/27/23       Left Right  Left     Elbow Ext/Flex WNLs WNLs  WNLs     Supination/Pronation WNLs WNLs  WNLs     Wrist Ext/Flex 30/25 80/75  45/50     Wrist RD/UD 10/15 20/35  15/20        Hand ROM. Measured in degrees.    11/13/2023 11/13/2023 11/27/23       Left Right  Left                 Thumb: MP 35 50  50                  IP 40 80  65         Rad ABD 55 55  55         Pal ABD 50 55  55            Opposition Base of SF Base of SF  Base of SF         Strength (Dynamometer) and Pinch Strength (Pinch Gauge)  Measured in pounds.    11/27/2023 11/27/2023         Left Right       Rung II 28.9 82.2       Clark Pinch  10  20       3pt  Pinch  6 14            Treatment     Genaro received the following supervised modalities after being cleared for contradictions for 10 minutes:   -Paraffin to decrease pain and stiffness and increase tissue elasticity    Genaro received the following manual therapy techniques for 10 minutes:   -IASTM to decrease stiffness and adhesions in surrounding tissues    Genaro received therapeutic activities for 25 minutes including:  - Wrist and thumb AROM  - Wrist maze x 3 min  - Ioshpheres x 2 min  - Yellow flexbar smiles, frowns, and twists x 20   - Wrist 3 ways 1# x 20  - Yellow theraputty , roll and pinch, pancake press and dowel press x 5 min    Home Exercises and Education Provided     Education provided:   - Continue HEP  - Progress towards goals     Written Home Exercises Provided: Patient instructed to cont prior HEP.  Exercises were reviewed and Genaro was able to demonstrate them prior to the end of the session.  Genaro demonstrated good  understanding of the HEP provided.   .   See EMR under Patient Instructions for exercises provided prior visit.        Assessment     Pt would continue to benefit from skilled OT. He demonstrates increased ROM in Left wrist and thumb and reports no pain. Pt performed all exercises without difficulty. Initial  and pinch measurements taken. Plan to progress via protocol as tolerated.    Genaro is progressing well towards his goals and there are no updates to goals at this time. Pt prognosis is Excellent.     Pt will continue to benefit from skilled outpatient occupational therapy to address the deficits listed in the problem list on initial evaluation provide pt/family education and to maximize pt's level of independence in the home and community environment.     Anticipated barriers to occupational therapy: none    Pt's spiritual, cultural and educational needs considered and pt agreeable to plan of care and goals.    Goals:  The following goals were discussed with the patient  and patient is in agreement with them as to be addressed in the treatment plan.      Goals:   The following goals were discussed with the patient and patient is in agreement with them as to be addressed in the treatment plan.   Short term Goals:  1) Initiate HEP Met  2) Pt will increase AROM of L wrist by 5-10 degrees in order to assist with ADLs by 4 weeks. Met  3) Pt will reduce pain to less than 2/10 by 4 weeks. Met  5) Measure  and pinch strength when appropriate  Met  6) Patient will be able to achieve less than or equal to 40% on the FOTO, demonstrating overall improved functional ability with upper extremity. (Self-care category) Progressing 11/27/2023     Long Term Goals:  Goals to be met by discharge:  1) Independent with HEP Progressing 11/27/2023  2) Pt will increase L wrist TELLO by 30 degrees in order to increase functional use for grasp with home management or work related tasks by d/c.  Progressing 11/27/2023  3) Pt will increase functional  strength by 5# in order to A in opening containers for med management or home management tasks by d/c.  Progressing 11/27/2023  4) Pt will decrease pain to trace or none while completing light home management tasks or work related tasks by d/c.  Progressing 11/27/2023  5) Patient will be able to achieve less than or equal to 30% on the FOTO, demonstrating overall improved functional ability with upper extremity.  (Self-care category)  Progressing 11/27/2023    Plan   Continue with POC  Updates/Grading for next session: progress via protocol as tolerated      Margaret Boasberg, OT

## 2023-12-01 ENCOUNTER — PATIENT MESSAGE (OUTPATIENT)
Dept: ORTHOPEDICS | Facility: CLINIC | Age: 33
End: 2023-12-01
Payer: COMMERCIAL

## 2023-12-01 ENCOUNTER — TELEPHONE (OUTPATIENT)
Dept: ORTHOPEDICS | Facility: CLINIC | Age: 33
End: 2023-12-01
Payer: COMMERCIAL

## 2023-12-04 ENCOUNTER — HOSPITAL ENCOUNTER (OUTPATIENT)
Dept: RADIOLOGY | Facility: OTHER | Age: 33
Discharge: HOME | End: 2023-12-04
Attending: SPECIALIST/TECHNOLOGIST
Payer: COMMERCIAL

## 2023-12-04 ENCOUNTER — OFFICE VISIT (OUTPATIENT)
Dept: ORTHOPEDICS | Facility: CLINIC | Age: 33
End: 2023-12-04
Payer: COMMERCIAL

## 2023-12-04 ENCOUNTER — CLINICAL SUPPORT (OUTPATIENT)
Dept: REHABILITATION | Facility: HOSPITAL | Age: 33
End: 2023-12-04
Payer: COMMERCIAL

## 2023-12-04 VITALS — HEIGHT: 71 IN | WEIGHT: 128.06 LBS | BODY MASS INDEX: 17.93 KG/M2

## 2023-12-04 DIAGNOSIS — Z98.890 POST-OPERATIVE STATE: Primary | ICD-10-CM

## 2023-12-04 DIAGNOSIS — M25.532 LEFT WRIST PAIN: ICD-10-CM

## 2023-12-04 DIAGNOSIS — R52 PAIN: Primary | ICD-10-CM

## 2023-12-04 DIAGNOSIS — M25.642 STIFFNESS OF LEFT HAND JOINT: Primary | ICD-10-CM

## 2023-12-04 DIAGNOSIS — R29.898 WEAKNESS OF LEFT HAND: ICD-10-CM

## 2023-12-04 PROCEDURE — 97018 PARAFFIN BATH THERAPY: CPT

## 2023-12-04 PROCEDURE — 73110 XR WRIST COMPLETE 3 VIEWS LEFT: ICD-10-PCS | Mod: 26,LT,, | Performed by: RADIOLOGY

## 2023-12-04 PROCEDURE — 99024 PR POST-OP FOLLOW-UP VISIT: ICD-10-PCS | Mod: S$GLB,,, | Performed by: SPECIALIST/TECHNOLOGIST

## 2023-12-04 PROCEDURE — 73110 X-RAY EXAM OF WRIST: CPT | Mod: 26,LT,, | Performed by: RADIOLOGY

## 2023-12-04 PROCEDURE — 99024 POSTOP FOLLOW-UP VISIT: CPT | Mod: S$GLB,,, | Performed by: SPECIALIST/TECHNOLOGIST

## 2023-12-04 PROCEDURE — 73110 X-RAY EXAM OF WRIST: CPT | Mod: TC,FY,LT

## 2023-12-04 PROCEDURE — 99999 PR PBB SHADOW E&M-EST. PATIENT-LVL II: CPT | Mod: PBBFAC,,, | Performed by: SPECIALIST/TECHNOLOGIST

## 2023-12-04 PROCEDURE — 97140 MANUAL THERAPY 1/> REGIONS: CPT

## 2023-12-04 PROCEDURE — 97530 THERAPEUTIC ACTIVITIES: CPT

## 2023-12-04 PROCEDURE — 99999 PR PBB SHADOW E&M-EST. PATIENT-LVL II: ICD-10-PCS | Mod: PBBFAC,,, | Performed by: SPECIALIST/TECHNOLOGIST

## 2023-12-04 NOTE — PROGRESS NOTES
Occupational Therapy Treatment Note     Date: 12/4/2023  Name: Genaro Linn  [facility] Number: [id number]    Therapy Diagnosis:   Encounter Diagnoses   Name Primary?    Stiffness of left hand joint Yes    Weakness of left hand      Physician: Maria Elias PA-C    Physician Orders: Eval and Treat  Medical Diagnosis:   Z98.890 (ICD-10-CM) - Post-operative state      Surgical Procedure and Date: L scaphoid ORIF, 9/25/23  Evaluation Date: 11/13/2023  Insurance Authorization Period Expiration: 12/31/23  Plan of Care Certification Period: 2/9/24  Date of Return to MD: 12/4/23  Visit # / Visits authorized: 3 / 20    Time In: 12:00 pm  Time Out: 12:45 pm  Total Billable Time: 45 minutes    Precautions:  Standard      Subjective     Pt reports: no new c/o  he was compliant with home exercise program given last session.   Response to previous treatment: tolerated well    Pain: 0/10  Location: left wrists      OBJECTIVE     Observation/Appearance: pt arrived wearing OTC exos splint; no apparent edema; healed incision on dorsal radial wrist     Edema. Measured in centimeters.    11/13/2023 11/13/2023         Left Right       Wrist Crease 16.7 16       Distal Palmar Crease 18.8 19.8       Thumb P1 6.7 6.3             Elbow and Wrist ROM. Measured in degrees.    11/13/2023 11/13/2023 11/27/23       Left Right  Left     Elbow Ext/Flex WNLs WNLs  WNLs     Supination/Pronation WNLs WNLs  WNLs     Wrist Ext/Flex 30/25 80/75  45/50     Wrist RD/UD 10/15 20/35  15/20        Hand ROM. Measured in degrees.    11/13/2023 11/13/2023 11/27/23       Left Right  Left                 Thumb: MP 35 50  50                  IP 40 80  65         Rad ABD 55 55  55         Pal ABD 50 55  55            Opposition Base of SF Base of SF  Base of SF         Strength (Dynamometer) and Pinch Strength (Pinch Gauge)  Measured in pounds.    11/27/2023 11/27/2023         Left Right       Rung II 28.9 82.2       Clark Pinch  10  20       3pt Pinch  6 14             Treatment     Genaro received the following supervised modalities after being cleared for contradictions for 10 minutes:   -Paraffin to decrease pain and stiffness and increase tissue elasticity    Genaro received the following manual therapy techniques for 10 minutes:   -IASTM to decrease stiffness and adhesions in surrounding tissues    Genaro received therapeutic activities for 25 minutes including:  - Wrist and thumb AROM  - Wrist maze x 3 min  - Ioshpheres x 2 min  - Yellow flexbar smiles, frowns, and twists x 20   - Wrist 3 ways 1# x 20  - Yellow theraputty , roll and pinch, pancake press and dowel press x 5 min    Home Exercises and Education Provided     Education provided:   - Continue HEP  - Progress towards goals     Written Home Exercises Provided: Patient instructed to cont prior HEP.  Exercises were reviewed and Genaro was able to demonstrate them prior to the end of the session.  Genaro demonstrated good  understanding of the HEP provided.   .   See EMR under Patient Instructions for exercises provided prior visit.        Assessment     Pt would continue to benefit from skilled OT. He performed all exercises without difficulty. Plan to progress via protocol as tolerated.    Genaro is progressing well towards his goals and there are no updates to goals at this time. Pt prognosis is Excellent.     Pt will continue to benefit from skilled outpatient occupational therapy to address the deficits listed in the problem list on initial evaluation provide pt/family education and to maximize pt's level of independence in the home and community environment.     Anticipated barriers to occupational therapy: none    Pt's spiritual, cultural and educational needs considered and pt agreeable to plan of care and goals.    Goals:  The following goals were discussed with the patient and patient is in agreement with them as to be addressed in the treatment plan.      Goals:   The following goals were discussed with  the patient and patient is in agreement with them as to be addressed in the treatment plan.   Short term Goals:  1) Initiate HEP Met  2) Pt will increase AROM of L wrist by 5-10 degrees in order to assist with ADLs by 4 weeks. Met  3) Pt will reduce pain to less than 2/10 by 4 weeks. Met  5) Measure  and pinch strength when appropriate  Met  6) Patient will be able to achieve less than or equal to 40% on the FOTO, demonstrating overall improved functional ability with upper extremity. (Self-care category) Progressing 12/4/2023     Long Term Goals:  Goals to be met by discharge:  1) Independent with HEP Progressing 12/4/2023  2) Pt will increase L wrist TELLO by 30 degrees in order to increase functional use for grasp with home management or work related tasks by d/c.  Progressing 12/4/2023  3) Pt will increase functional  strength by 5# in order to A in opening containers for med management or home management tasks by d/c.  Progressing 12/4/2023  4) Pt will decrease pain to trace or none while completing light home management tasks or work related tasks by d/c.  Progressing 12/4/2023  5) Patient will be able to achieve less than or equal to 30% on the FOTO, demonstrating overall improved functional ability with upper extremity.  (Self-care category)  Progressing 12/4/2023    Plan   Continue with POC  Updates/Grading for next session: progress via protocol as tolerated      Margaret Boasberg, OT

## 2023-12-04 NOTE — PROGRESS NOTES
Mr. Linn is here today for a post-operative visit.  He is 12 weeks status post ORIF L Scaphoid Fracture by Dr. Tran on 9/25/23. He reports that he is well. He reports compliance with bone stimulator use. Pain is minimal. States he has been making bread and pasta with minimal wrist soreness. States he has been attending therapy.  He denies fever, chills, and sweats since the time of the surgery.     Physical exam:    There were no vitals filed for this visit.    Vital signs are stable, patient is afebrile.  Patient is well dressed and well groomed, no acute distress.  Alert and oriented to person, place, and time.  Incision is well healed. There is no erythema or exudate.  There is no sign of any infection. He is NVI. Good finger motion.    Assessment: status post ORIF L Scaphoid Fracture by Dr. Tran on 9/25/23    Plan:  There are no diagnoses linked to this encounter.    PO instruction reviewed and provided to patient  Continue with Therapy  Transition to left thumb spica exos discussed use   Continue bone stimulator   RTC 6 wks with xray   Patient can RTW as a  with wrist brace with no restrictions.

## 2024-01-09 ENCOUNTER — TELEPHONE (OUTPATIENT)
Dept: ORTHOPEDICS | Facility: CLINIC | Age: 34
End: 2024-01-09
Payer: COMMERCIAL

## 2024-01-16 ENCOUNTER — TELEPHONE (OUTPATIENT)
Dept: ORTHOPEDICS | Facility: CLINIC | Age: 34
End: 2024-01-16
Payer: COMMERCIAL

## 2024-01-16 ENCOUNTER — OFFICE VISIT (OUTPATIENT)
Dept: ORTHOPEDICS | Facility: CLINIC | Age: 34
End: 2024-01-16
Payer: COMMERCIAL

## 2024-01-16 ENCOUNTER — HOSPITAL ENCOUNTER (OUTPATIENT)
Dept: RADIOLOGY | Facility: OTHER | Age: 34
Discharge: HOME | End: 2024-01-16
Attending: SPECIALIST/TECHNOLOGIST
Payer: COMMERCIAL

## 2024-01-16 VITALS — BODY MASS INDEX: 17.93 KG/M2 | HEIGHT: 71 IN | WEIGHT: 128.06 LBS

## 2024-01-16 DIAGNOSIS — Z98.890 POST-OPERATIVE STATE: Primary | ICD-10-CM

## 2024-01-16 DIAGNOSIS — R52 PAIN: ICD-10-CM

## 2024-01-16 PROCEDURE — 99999 PR PBB SHADOW E&M-EST. PATIENT-LVL III: CPT | Mod: PBBFAC,,, | Performed by: SPECIALIST/TECHNOLOGIST

## 2024-01-16 PROCEDURE — 73110 X-RAY EXAM OF WRIST: CPT | Mod: 26,LT,, | Performed by: INTERNAL MEDICINE

## 2024-01-16 PROCEDURE — 73110 X-RAY EXAM OF WRIST: CPT | Mod: TC,FY,LT

## 2024-01-16 PROCEDURE — 99024 POSTOP FOLLOW-UP VISIT: CPT | Mod: S$GLB,,, | Performed by: SPECIALIST/TECHNOLOGIST

## 2024-01-16 NOTE — PROGRESS NOTES
"Mr. Linn is here today for a post-operative visit.  He is 18 weeks status post ORIF L Scaphoid Fracture by Dr. Tran on 9/25/23. He reports that he is in no pain.  He states he is returned to work with no issues.  He states he is working as a  for the past month.  He does note a small mobile nontender mass located in the palm.  He states he noticed it right before Thanksgiving.  He states it does not bother him he just randomly noticed.  He denies fever, chills, and sweats since the time of the surgery.     Physical exam:    Vitals:    01/16/24 1407   Weight: 58.1 kg (128 lb 1.4 oz)   Height: 5' 11" (1.803 m)   PainSc: 0-No pain       Vital signs are stable, patient is afebrile.  Patient is well dressed and well groomed, no acute distress.  Alert and oriented to person, place, and time.  Incision is well healed. There is no erythema or exudate.  There is no sign of any infection. He is NVI.  Full range of motion of finger and wrist.  Mobile nontender mass located in the palmar aspect of the hand.    Assessment: status post ORIF L Scaphoid Fracture by Dr. Tran on 9/25/23    Plan:  Genaro was seen today for follow-up.    Diagnoses and all orders for this visit:    Post-operative state        Patient has full strength and range of motion.  He is returned to work with no issues   He will return to clinic as needed    "

## 2024-01-16 NOTE — TELEPHONE ENCOUNTER
Spoke c pt. Confirmed appt location & time c Rony 01/16/24. Pt expressed understanding & was thankful.

## 2024-09-23 NOTE — PATIENT INSTRUCTIONS
Go to the urgent care for in person evaluation.    Leuk- neg  Nitrate- neg  Blood- neg  Ketones- neg

## (undated) DEVICE — SUT MCRYL PLUS 4-0 PS2 27IN

## (undated) DEVICE — NDL HYPO REG 25G X 1 1/2

## (undated) DEVICE — BUCKET PLASTER DISPOSABLE

## (undated) DEVICE — Device

## (undated) DEVICE — SOL IRR SOD CHL .9% POUR

## (undated) DEVICE — GAUZE SPONGE 4X4 12PLY

## (undated) DEVICE — GLOVE BIOGEL ECLIPSE SZ 7

## (undated) DEVICE — BANDAGE MATRIX HK LOOP 4IN 5YD

## (undated) DEVICE — BANDAGE GAUZE 6PLY FLUFF 2X3

## (undated) DEVICE — DRAPE THREE-QTR REINF 53X77IN

## (undated) DEVICE — IMPLANTABLE DEVICE
Type: IMPLANTABLE DEVICE | Site: HAND | Status: NON-FUNCTIONAL
Removed: 2023-09-25

## (undated) DEVICE — SOL NACL IRR 1000ML BTL

## (undated) DEVICE — SYR B-D DISP CONTROL 10CC100/C

## (undated) DEVICE — SPLINT PLASTER FAST SET 5X30IN

## (undated) DEVICE — PAD CAST SPECIALIST STRL 4

## (undated) DEVICE — DRESSING N ADH OIL EMUL 3X3

## (undated) DEVICE — SEE MEDLINE ITEM 159592

## (undated) DEVICE — APPLICATOR CHLORAPREP ORN 26ML

## (undated) DEVICE — UNDERPAD ULTRASORB 300LB 30X36

## (undated) DEVICE — GLOVE BIOGEL SKINSENSE PI 7.0

## (undated) DEVICE — BLADE SURG #15 CARBON STEEL

## (undated) DEVICE — SUT MONOCRYL PLUS UD 3-0 27

## (undated) DEVICE — DRESSING XEROFORM NONADH 1X8IN

## (undated) DEVICE — CORD FOR BIPOLAR FORCEPS 12

## (undated) DEVICE — FORCEP STRAIGHT DISP

## (undated) DEVICE — DRAPE C-ARM MINI DISP

## (undated) DEVICE — DRAPE SURG W/TWL 17 5/8X23

## (undated) DEVICE — SLING ARM LARGE FOAM STRAP

## (undated) DEVICE — TOURNIQUET SB QC DP 18X4IN